# Patient Record
Sex: MALE | Race: BLACK OR AFRICAN AMERICAN | Employment: FULL TIME | URBAN - METROPOLITAN AREA
[De-identification: names, ages, dates, MRNs, and addresses within clinical notes are randomized per-mention and may not be internally consistent; named-entity substitution may affect disease eponyms.]

---

## 2017-12-11 PROBLEM — D17.9 MULTIPLE LIPOMAS: Status: ACTIVE | Noted: 2017-12-11

## 2017-12-11 PROBLEM — Z72.0 TOBACCO USE: Status: ACTIVE | Noted: 2017-12-11

## 2019-06-27 PROCEDURE — 87507 IADNA-DNA/RNA PROBE TQ 12-25: CPT | Performed by: INTERNAL MEDICINE

## 2019-08-03 ENCOUNTER — HOSPITAL ENCOUNTER (EMERGENCY)
Facility: HOSPITAL | Age: 54
Discharge: HOME | End: 2019-08-03
Admitting: NURSE PRACTITIONER
Payer: MEDICAID

## 2019-08-03 ENCOUNTER — APPOINTMENT (OUTPATIENT)
Dept: CT IMAGING | Facility: HOSPITAL | Age: 54
End: 2019-08-03
Attending: NURSE PRACTITIONER
Payer: MEDICAID

## 2019-08-03 VITALS
HEIGHT: 71 IN | OXYGEN SATURATION: 98 % | HEART RATE: 52 BPM | DIASTOLIC BLOOD PRESSURE: 75 MMHG | RESPIRATION RATE: 18 BRPM | SYSTOLIC BLOOD PRESSURE: 132 MMHG | BODY MASS INDEX: 28.7 KG/M2 | WEIGHT: 205 LBS | TEMPERATURE: 98 F

## 2019-08-03 DIAGNOSIS — R93.5 ABNORMAL CT OF THE ABDOMEN: ICD-10-CM

## 2019-08-03 DIAGNOSIS — R31.9 HEMATURIA, UNSPECIFIED TYPE: Primary | ICD-10-CM

## 2019-08-03 DIAGNOSIS — N40.0 PROSTATE ENLARGEMENT: ICD-10-CM

## 2019-08-03 LAB
ALBUMIN SERPL-MCNC: 3.7 G/DL (ref 3.4–5)
ALBUMIN/GLOB SERPL: 0.9 {RATIO} (ref 1–2)
ALP LIVER SERPL-CCNC: 86 U/L (ref 45–117)
ALT SERPL-CCNC: 78 U/L (ref 16–61)
ANION GAP SERPL CALC-SCNC: 8 MMOL/L (ref 0–18)
AST SERPL-CCNC: 57 U/L (ref 15–37)
BACTERIA UR QL AUTO: NEGATIVE /HPF
BASOPHILS # BLD AUTO: 0.02 X10(3) UL (ref 0–0.2)
BASOPHILS NFR BLD AUTO: 0.5 %
BILIRUB SERPL-MCNC: 0.4 MG/DL (ref 0.1–2)
BILIRUB UR QL: NEGATIVE
BUN BLD-MCNC: 7 MG/DL (ref 7–18)
BUN/CREAT SERPL: 7.1 (ref 10–20)
CALCIUM BLD-MCNC: 8.6 MG/DL (ref 8.5–10.1)
CHLORIDE SERPL-SCNC: 108 MMOL/L (ref 98–112)
CLARITY UR: CLEAR
CO2 SERPL-SCNC: 24 MMOL/L (ref 21–32)
COLOR UR: YELLOW
CREAT BLD-MCNC: 0.98 MG/DL (ref 0.7–1.3)
DEPRECATED RDW RBC AUTO: 39.6 FL (ref 35.1–46.3)
EOSINOPHIL # BLD AUTO: 0.02 X10(3) UL (ref 0–0.7)
EOSINOPHIL NFR BLD AUTO: 0.5 %
ERYTHROCYTE [DISTWIDTH] IN BLOOD BY AUTOMATED COUNT: 14.1 % (ref 11–15)
GLOBULIN PLAS-MCNC: 3.9 G/DL (ref 2.8–4.4)
GLUCOSE BLD-MCNC: 78 MG/DL (ref 70–99)
GLUCOSE UR-MCNC: NEGATIVE MG/DL
HCT VFR BLD AUTO: 42.7 % (ref 39–53)
HGB BLD-MCNC: 15.1 G/DL (ref 13–17.5)
IMM GRANULOCYTES # BLD AUTO: 0 X10(3) UL (ref 0–1)
IMM GRANULOCYTES NFR BLD: 0 %
KETONES UR-MCNC: NEGATIVE MG/DL
LEUKOCYTE ESTERASE UR QL STRIP.AUTO: NEGATIVE
LYMPHOCYTES # BLD AUTO: 1.96 X10(3) UL (ref 1–4)
LYMPHOCYTES NFR BLD AUTO: 47.2 %
M PROTEIN MFR SERPL ELPH: 7.6 G/DL (ref 6.4–8.2)
MCH RBC QN AUTO: 27.7 PG (ref 26–34)
MCHC RBC AUTO-ENTMCNC: 35.4 G/DL (ref 31–37)
MCV RBC AUTO: 78.2 FL (ref 80–100)
MONOCYTES # BLD AUTO: 0.44 X10(3) UL (ref 0.1–1)
MONOCYTES NFR BLD AUTO: 10.6 %
NEUTROPHILS # BLD AUTO: 1.71 X10 (3) UL (ref 1.5–7.7)
NEUTROPHILS # BLD AUTO: 1.71 X10(3) UL (ref 1.5–7.7)
NEUTROPHILS NFR BLD AUTO: 41.2 %
NITRITE UR QL STRIP.AUTO: NEGATIVE
OSMOLALITY SERPL CALC.SUM OF ELEC: 287 MOSM/KG (ref 275–295)
PH UR: 5 [PH] (ref 5–8)
PLATELET # BLD AUTO: 190 10(3)UL (ref 150–450)
POTASSIUM SERPL-SCNC: 3.8 MMOL/L (ref 3.5–5.1)
PROT UR-MCNC: NEGATIVE MG/DL
RBC # BLD AUTO: 5.46 X10(6)UL (ref 4.3–5.7)
RBC #/AREA URNS AUTO: 7 /HPF
SODIUM SERPL-SCNC: 140 MMOL/L (ref 136–145)
SP GR UR STRIP: 1.01 (ref 1–1.03)
UROBILINOGEN UR STRIP-ACNC: <2
VIT C UR-MCNC: NEGATIVE MG/DL
WBC # BLD AUTO: 4.2 X10(3) UL (ref 4–11)
WBC #/AREA URNS AUTO: 4 /HPF

## 2019-08-03 PROCEDURE — 80053 COMPREHEN METABOLIC PANEL: CPT | Performed by: NURSE PRACTITIONER

## 2019-08-03 PROCEDURE — 99284 EMERGENCY DEPT VISIT MOD MDM: CPT

## 2019-08-03 PROCEDURE — 36415 COLL VENOUS BLD VENIPUNCTURE: CPT

## 2019-08-03 PROCEDURE — 81001 URINALYSIS AUTO W/SCOPE: CPT | Performed by: NURSE PRACTITIONER

## 2019-08-03 PROCEDURE — 85025 COMPLETE CBC W/AUTO DIFF WBC: CPT | Performed by: NURSE PRACTITIONER

## 2019-08-03 PROCEDURE — 74178 CT ABD&PLV WO CNTR FLWD CNTR: CPT | Performed by: NURSE PRACTITIONER

## 2019-08-03 RX ORDER — SULFAMETHOXAZOLE AND TRIMETHOPRIM 800; 160 MG/1; MG/1
1 TABLET ORAL 2 TIMES DAILY
Qty: 14 TABLET | Refills: 0 | Status: SHIPPED | OUTPATIENT
Start: 2019-08-03 | End: 2019-08-13

## 2019-08-03 NOTE — ED PROVIDER NOTES
Patient Seen in: [facility] AND [facility] Emergency Department    History   Patient presents with:  Bleeding (hematologic)    Stated Complaint: hematuria    52yo/m with no chronic medical problems reports to the ED With complaints of painless blood at the [facility] round, and reactive to light. Conjunctivae and EOM are normal.   Neck: Normal range of motion. Neck supple. Cardiovascular: Normal rate, regular rhythm, normal heart sounds and intact distal pulses.    Pulmonary/Chest: Effort normal and breath sounds norm RAINBOW DRAW LIGHT GREEN   RAINBOW DRAW GOLD           PROCEDURE: CT ABDOMEN + PELVIS (ALL W+WO) (CPT=74178)     COMPARISON: None.     INDICATIONS: Gross hematuria with dysuria.     TECHNIQUE: CT images of the abdomen and pelvis were obtained without and scarring.               Impression     CONCLUSION:   1. No hydroureteronephrosis or urinary calcifications.  Small approximately 3 mm probable cyst interpolar region left kidney .  No other significant renal abnormalities.   2. Prostatomegaly.  Correlate wi

## 2019-08-03 NOTE — ED INITIAL ASSESSMENT (HPI)
Reports blood in urine this morning. Dysuria and blood at meatus. Denies injury or penile discharge.

## 2020-09-05 ENCOUNTER — APPOINTMENT (OUTPATIENT)
Dept: GENERAL RADIOLOGY | Facility: HOSPITAL | Age: 55
End: 2020-09-05
Attending: PHYSICIAN ASSISTANT
Payer: MEDICAID

## 2020-09-05 ENCOUNTER — HOSPITAL ENCOUNTER (EMERGENCY)
Facility: HOSPITAL | Age: 55
Discharge: HOME | End: 2020-09-05
Attending: PHYSICIAN ASSISTANT
Payer: MEDICAID

## 2020-09-05 VITALS
WEIGHT: 220 LBS | HEART RATE: 81 BPM | DIASTOLIC BLOOD PRESSURE: 68 MMHG | RESPIRATION RATE: 16 BRPM | SYSTOLIC BLOOD PRESSURE: 122 MMHG | OXYGEN SATURATION: 96 % | TEMPERATURE: 99 F | BODY MASS INDEX: 30.8 KG/M2 | HEIGHT: 71 IN

## 2020-09-05 DIAGNOSIS — R03.0 ELEVATED BLOOD PRESSURE READING: ICD-10-CM

## 2020-09-05 DIAGNOSIS — S39.012A STRAIN OF LUMBAR REGION, INITIAL ENCOUNTER: Primary | ICD-10-CM

## 2020-09-05 PROCEDURE — 99283 EMERGENCY DEPT VISIT LOW MDM: CPT

## 2020-09-05 PROCEDURE — 72100 X-RAY EXAM L-S SPINE 2/3 VWS: CPT | Performed by: PHYSICIAN ASSISTANT

## 2020-09-05 PROCEDURE — 96372 THER/PROPH/DIAG INJ SC/IM: CPT

## 2020-09-05 RX ORDER — LIDOCAINE 50 MG/G
1 PATCH TOPICAL EVERY 24 HOURS
Qty: 6 PATCH | Refills: 0 | Status: SHIPPED | OUTPATIENT
Start: 2020-09-05 | End: 2020-09-08

## 2020-09-05 RX ORDER — IBUPROFEN 600 MG/1
TABLET ORAL
Qty: 20 TABLET | Refills: 0 | Status: SHIPPED | OUTPATIENT
Start: 2020-09-05 | End: 2020-09-11

## 2020-09-05 RX ORDER — DIAZEPAM 5 MG/1
5 TABLET ORAL ONCE
Status: COMPLETED | OUTPATIENT
Start: 2020-09-05 | End: 2020-09-05

## 2020-09-05 RX ORDER — HYDROCODONE BITARTRATE AND ACETAMINOPHEN 5; 325 MG/1; MG/1
1 TABLET ORAL EVERY 6 HOURS PRN
Qty: 12 TABLET | Refills: 0 | Status: SHIPPED | OUTPATIENT
Start: 2020-09-05 | End: 2020-09-09

## 2020-09-05 RX ORDER — CYCLOBENZAPRINE HCL 10 MG
10 TABLET ORAL 3 TIMES DAILY PRN
Qty: 14 TABLET | Refills: 0 | Status: ON HOLD | OUTPATIENT
Start: 2020-09-05 | End: 2020-09-22

## 2020-09-05 RX ORDER — KETOROLAC TROMETHAMINE 30 MG/ML
60 INJECTION, SOLUTION INTRAMUSCULAR; INTRAVENOUS ONCE
Status: COMPLETED | OUTPATIENT
Start: 2020-09-05 | End: 2020-09-05

## 2020-09-05 RX ORDER — METHYLPREDNISOLONE 4 MG/1
TABLET ORAL
Qty: 1 PACKAGE | Refills: 0 | Status: SHIPPED | OUTPATIENT
Start: 2020-09-05 | End: 2020-09-08

## 2020-09-05 NOTE — ED PROVIDER NOTES
Patient Seen in: [facility] AND [facility] Emergency Department    History   Patient presents with:  Back Pain    Stated Complaint: back pain- no fall    HPI    Mp Single is a 48year old male who presents with chief complaint of bilateral low back pain.   Onset and vital signs reviewed. All other systems reviewed and negative except as noted above. PSFH elements reviewed from today and agreed except as otherwise stated in HPI.     Physical Exam     ED Triage Vitals [09/05/20 1320]   BP (!) 166/97   Pulse 7 Normal gait. Normal sensory exam.  DTRs intact and symmetric bilaterally. Patient exhibits normal speech. Skin: Skin is normal to inspection. Warm and dry. No obvious bruising. No obvious rash.       ED Course   Labs Reviewed - No data to display    M List    START taking these medications    ibuprofen 600 MG Oral Tab  Take 1 tablet (600 mg total) by mouth every 6 hours with food  Qty: 20 tablet Refills: 0    cyclobenzaprine 10 MG Oral Tab  Take 1 tablet (10 mg total) by mouth 3 (three) times daily as n

## 2020-09-05 NOTE — ED INITIAL ASSESSMENT (HPI)
PATIENT AOX3 TO ED VIA PRIVATE VEHICLE PATIENT CO OF LOWER BACK PAIN X FEW DAYS. DENIES TRAUMA. PAIN 10/10.

## 2020-09-11 ENCOUNTER — HOSPITAL ENCOUNTER (INPATIENT)
Facility: HOSPITAL | Age: 55
LOS: 6 days | Discharge: HOME HEALTH | DRG: 519 | End: 2020-09-22
Attending: EMERGENCY MEDICINE | Admitting: HOSPITALIST
Payer: MEDICAID

## 2020-09-11 ENCOUNTER — APPOINTMENT (OUTPATIENT)
Dept: MRI IMAGING | Facility: HOSPITAL | Age: 55
DRG: 519 | End: 2020-09-11
Attending: EMERGENCY MEDICINE
Payer: MEDICAID

## 2020-09-11 DIAGNOSIS — M54.16 LUMBAR RADICULOPATHY: ICD-10-CM

## 2020-09-11 DIAGNOSIS — M54.59 INTRACTABLE LOW BACK PAIN: Primary | ICD-10-CM

## 2020-09-11 DIAGNOSIS — M51.26 HNP (HERNIATED NUCLEUS PULPOSUS), LUMBAR: ICD-10-CM

## 2020-09-11 LAB
ANION GAP SERPL CALC-SCNC: 4 MMOL/L (ref 0–18)
BASOPHILS # BLD AUTO: 0.03 X10(3) UL (ref 0–0.2)
BASOPHILS NFR BLD AUTO: 0.3 %
BUN BLD-MCNC: 15 MG/DL (ref 7–18)
BUN/CREAT SERPL: 11.7 (ref 10–20)
CALCIUM BLD-MCNC: 9.7 MG/DL (ref 8.5–10.1)
CHLORIDE SERPL-SCNC: 102 MMOL/L (ref 98–112)
CO2 SERPL-SCNC: 29 MMOL/L (ref 21–32)
CREAT BLD-MCNC: 1.28 MG/DL (ref 0.7–1.3)
DEPRECATED RDW RBC AUTO: 37.2 FL (ref 35.1–46.3)
EOSINOPHIL # BLD AUTO: 0.01 X10(3) UL (ref 0–0.7)
EOSINOPHIL NFR BLD AUTO: 0.1 %
ERYTHROCYTE [DISTWIDTH] IN BLOOD BY AUTOMATED COUNT: 13.4 % (ref 11–15)
GLUCOSE BLD-MCNC: 93 MG/DL (ref 70–99)
HCT VFR BLD AUTO: 45.5 % (ref 39–53)
HGB BLD-MCNC: 16.5 G/DL (ref 13–17.5)
IMM GRANULOCYTES # BLD AUTO: 0.04 X10(3) UL (ref 0–1)
IMM GRANULOCYTES NFR BLD: 0.4 %
LYMPHOCYTES # BLD AUTO: 2.54 X10(3) UL (ref 1–4)
LYMPHOCYTES NFR BLD AUTO: 24.2 %
MCH RBC QN AUTO: 28 PG (ref 26–34)
MCHC RBC AUTO-ENTMCNC: 36.3 G/DL (ref 31–37)
MCV RBC AUTO: 77.2 FL (ref 80–100)
MONOCYTES # BLD AUTO: 1.11 X10(3) UL (ref 0.1–1)
MONOCYTES NFR BLD AUTO: 10.6 %
NEUTROPHILS # BLD AUTO: 6.75 X10 (3) UL (ref 1.5–7.7)
NEUTROPHILS # BLD AUTO: 6.75 X10(3) UL (ref 1.5–7.7)
NEUTROPHILS NFR BLD AUTO: 64.4 %
OSMOLALITY SERPL CALC.SUM OF ELEC: 281 MOSM/KG (ref 275–295)
PLATELET # BLD AUTO: 279 10(3)UL (ref 150–450)
POTASSIUM SERPL-SCNC: 3.7 MMOL/L (ref 3.5–5.1)
RBC # BLD AUTO: 5.89 X10(6)UL (ref 4.3–5.7)
SODIUM SERPL-SCNC: 135 MMOL/L (ref 136–145)
WBC # BLD AUTO: 10.5 X10(3) UL (ref 4–11)

## 2020-09-11 RX ORDER — ACETAMINOPHEN 325 MG/1
650 TABLET ORAL EVERY 4 HOURS PRN
Status: DISCONTINUED | OUTPATIENT
Start: 2020-09-11 | End: 2020-09-12 | Stop reason: ALTCHOICE

## 2020-09-11 RX ORDER — MORPHINE SULFATE 4 MG/ML
4 INJECTION, SOLUTION INTRAMUSCULAR; INTRAVENOUS EVERY 2 HOUR PRN
Status: DISCONTINUED | OUTPATIENT
Start: 2020-09-11 | End: 2020-09-15

## 2020-09-11 RX ORDER — MORPHINE SULFATE 2 MG/ML
1 INJECTION, SOLUTION INTRAMUSCULAR; INTRAVENOUS EVERY 2 HOUR PRN
Status: DISCONTINUED | OUTPATIENT
Start: 2020-09-11 | End: 2020-09-15

## 2020-09-11 RX ORDER — SODIUM CHLORIDE 0.9 % (FLUSH) 0.9 %
3 SYRINGE (ML) INJECTION AS NEEDED
Status: DISCONTINUED | OUTPATIENT
Start: 2020-09-11 | End: 2020-09-21 | Stop reason: HOSPADM

## 2020-09-11 RX ORDER — MORPHINE SULFATE 4 MG/ML
INJECTION, SOLUTION INTRAMUSCULAR; INTRAVENOUS
Status: COMPLETED
Start: 2020-09-11 | End: 2020-09-11

## 2020-09-11 RX ORDER — HYDROCODONE BITARTRATE AND ACETAMINOPHEN 5; 325 MG/1; MG/1
1 TABLET ORAL EVERY 4 HOURS PRN
Status: DISCONTINUED | OUTPATIENT
Start: 2020-09-11 | End: 2020-09-21 | Stop reason: HOSPADM

## 2020-09-11 RX ORDER — LORAZEPAM 2 MG/ML
INJECTION INTRAMUSCULAR
Status: COMPLETED
Start: 2020-09-11 | End: 2020-09-11

## 2020-09-11 RX ORDER — HYDROCODONE BITARTRATE AND ACETAMINOPHEN 10; 325 MG/1; MG/1
1 TABLET ORAL EVERY 4 HOURS PRN
Status: DISCONTINUED | OUTPATIENT
Start: 2020-09-11 | End: 2020-09-21 | Stop reason: HOSPADM

## 2020-09-11 RX ORDER — DIAZEPAM 5 MG/1
5 TABLET ORAL EVERY 6 HOURS PRN
Status: DISCONTINUED | OUTPATIENT
Start: 2020-09-11 | End: 2020-09-15

## 2020-09-11 RX ORDER — MORPHINE SULFATE 4 MG/ML
4 INJECTION, SOLUTION INTRAMUSCULAR; INTRAVENOUS ONCE
Status: COMPLETED | OUTPATIENT
Start: 2020-09-11 | End: 2020-09-11

## 2020-09-11 RX ORDER — LORAZEPAM 2 MG/ML
1 INJECTION INTRAMUSCULAR ONCE
Status: COMPLETED | OUTPATIENT
Start: 2020-09-11 | End: 2020-09-11

## 2020-09-11 RX ORDER — HEPARIN SODIUM 5000 [USP'U]/ML
5000 INJECTION, SOLUTION INTRAVENOUS; SUBCUTANEOUS EVERY 8 HOURS SCHEDULED
Status: DISCONTINUED | OUTPATIENT
Start: 2020-09-12 | End: 2020-09-13

## 2020-09-11 RX ORDER — DIAZEPAM 5 MG/1
5 TABLET ORAL ONCE
Status: COMPLETED | OUTPATIENT
Start: 2020-09-11 | End: 2020-09-11

## 2020-09-11 RX ORDER — METHYLPREDNISOLONE SODIUM SUCCINATE 125 MG/2ML
60 INJECTION, POWDER, LYOPHILIZED, FOR SOLUTION INTRAMUSCULAR; INTRAVENOUS ONCE
Status: COMPLETED | OUTPATIENT
Start: 2020-09-11 | End: 2020-09-11

## 2020-09-11 RX ORDER — CYCLOBENZAPRINE HCL 10 MG
10 TABLET ORAL 3 TIMES DAILY PRN
Status: DISCONTINUED | OUTPATIENT
Start: 2020-09-11 | End: 2020-09-21 | Stop reason: HOSPADM

## 2020-09-11 RX ORDER — MORPHINE SULFATE 2 MG/ML
2 INJECTION, SOLUTION INTRAMUSCULAR; INTRAVENOUS EVERY 2 HOUR PRN
Status: DISCONTINUED | OUTPATIENT
Start: 2020-09-11 | End: 2020-09-15

## 2020-09-11 RX ORDER — HYDROCODONE BITARTRATE AND ACETAMINOPHEN 5; 325 MG/1; MG/1
2 TABLET ORAL EVERY 4 HOURS PRN
Status: DISCONTINUED | OUTPATIENT
Start: 2020-09-11 | End: 2020-09-21 | Stop reason: HOSPADM

## 2020-09-11 NOTE — H&P
[facility] Hospitalist Team  History and Physical  Admit Date:  9/11/20    ASSESSMENT / PLAN:   47 yo male with hx cigar use who presents with low back pain with radicular symptoms, MRI and orthospine consulted, see below for details    Intractable LBP with Radic 135/93   Pulse 84   Temp 98 °F (36.7 °C) (Oral)   Resp 18   Ht 5' 11\" (1.803 m)   Wt 222 lb (100.7 kg)   SpO2 97%   BMI 30.96 kg/m²     GENERAL: no apparent distress, overweight  NEUROLOGIC: A/A; Ox3, weakness to left leg with inability to dorsiflex  SKIN and examined independently. Discussed with APN and agree with note above. HPI51 yo male with hx cigar use who presents with low back pain with radicular symptoms  Symptoms started 2 weeks ago when he got out of bed.   Then this weekend was helping his

## 2020-09-11 NOTE — ED NOTES
Orders for admission, patient is aware of plan and ready to go upstairs. Any questions, please call ED RN LENY YEBOAH at [address].      Type of COVID test sent: m2000  COVID Suspicion level: Low    LOC at time of transport: Alert and oriented x 3.     Othe

## 2020-09-11 NOTE — ED INITIAL ASSESSMENT (HPI)
Pt reports lower back pain after bending to  a box. reports bilateral lower leg numbness and difficulty ambulating.  Reports falling this AM.

## 2020-09-11 NOTE — ED NOTES
Patient was feeling numbness and tingling in his L leg and had collapsed. Patient has hx of back pain but never to this extent. Patient states his legs had given out on him and he is still having difficulty walking without assistance.  Denies dizziness, susana

## 2020-09-11 NOTE — ED NOTES
Received call from MRI tech-patient unable to tolerate laying position for MRI. Orders received for pain medication. Patient medicated in MRI department.

## 2020-09-11 NOTE — ED PROVIDER NOTES
Patient Seen in: [facility] AND [facility] Emergency Department      History   Patient presents with:  Back Pain    Stated Complaint: Bilateral leg numbness- was seen here on Sat for back pain.      HPI    71-year-old healthy with now about 2 weeks of on and off 180.3 cm (5' 11\")   Wt 100.7 kg   SpO2 97%   BMI 30.96 kg/m²         Physical Exam    Constitutional: Oriented to person, place, and time. Appears well-developed. No distress. Head: Normocephalic and atraumatic.    Eyes: Conjunctivae are normal. Pupils individual orders.    RAINBOW DRAW BLUE   RAINBOW DRAW LAVENDER   RAINBOW DRAW LIGHT GREEN   RAINBOW DRAW GOLD          Xr Lumbar Spine (min 2 Views) (cpt=72100)    Result Date: 9/5/2020  PROCEDURE: XR LUMBAR SPINE (MIN 2 VIEWS) (CPT=72100)  COMPARISON: Non Admission  Date Reviewed: 9/8/2020          ICD-10-CM Noted POA    Intractable low back pain M54.5 9/11/2020 Unknown

## 2020-09-12 ENCOUNTER — APPOINTMENT (OUTPATIENT)
Dept: MRI IMAGING | Facility: HOSPITAL | Age: 55
DRG: 519 | End: 2020-09-12
Attending: ORTHOPAEDIC SURGERY
Payer: MEDICAID

## 2020-09-12 ENCOUNTER — APPOINTMENT (OUTPATIENT)
Dept: MRI IMAGING | Facility: HOSPITAL | Age: 55
DRG: 519 | End: 2020-09-12
Attending: EMERGENCY MEDICINE
Payer: MEDICAID

## 2020-09-12 LAB
ANION GAP SERPL CALC-SCNC: 6 MMOL/L (ref 0–18)
BASOPHILS # BLD AUTO: 0.01 X10(3) UL (ref 0–0.2)
BASOPHILS NFR BLD AUTO: 0.1 %
BUN BLD-MCNC: 16 MG/DL (ref 7–18)
BUN/CREAT SERPL: 13.4 (ref 10–20)
CALCIUM BLD-MCNC: 9.4 MG/DL (ref 8.5–10.1)
CHLORIDE SERPL-SCNC: 103 MMOL/L (ref 98–112)
CO2 SERPL-SCNC: 28 MMOL/L (ref 21–32)
CREAT BLD-MCNC: 1.19 MG/DL (ref 0.7–1.3)
CRP SERPL-MCNC: <0.29 MG/DL (ref ?–0.3)
DEPRECATED RDW RBC AUTO: 37.8 FL (ref 35.1–46.3)
EOSINOPHIL # BLD AUTO: 0 X10(3) UL (ref 0–0.7)
EOSINOPHIL NFR BLD AUTO: 0 %
ERYTHROCYTE [DISTWIDTH] IN BLOOD BY AUTOMATED COUNT: 13.6 % (ref 11–15)
ERYTHROCYTE [SEDIMENTATION RATE] IN BLOOD: 18 MM/HR (ref 0–20)
GLUCOSE BLD-MCNC: 90 MG/DL (ref 70–99)
HCT VFR BLD AUTO: 44.7 % (ref 39–53)
HGB BLD-MCNC: 16.2 G/DL (ref 13–17.5)
IMM GRANULOCYTES # BLD AUTO: 0.03 X10(3) UL (ref 0–1)
IMM GRANULOCYTES NFR BLD: 0.2 %
LYMPHOCYTES # BLD AUTO: 2.44 X10(3) UL (ref 1–4)
LYMPHOCYTES NFR BLD AUTO: 19.9 %
MCH RBC QN AUTO: 28 PG (ref 26–34)
MCHC RBC AUTO-ENTMCNC: 36.2 G/DL (ref 31–37)
MCV RBC AUTO: 77.2 FL (ref 80–100)
MONOCYTES # BLD AUTO: 1.17 X10(3) UL (ref 0.1–1)
MONOCYTES NFR BLD AUTO: 9.5 %
NEUTROPHILS # BLD AUTO: 8.61 X10 (3) UL (ref 1.5–7.7)
NEUTROPHILS # BLD AUTO: 8.61 X10(3) UL (ref 1.5–7.7)
NEUTROPHILS NFR BLD AUTO: 70.3 %
OSMOLALITY SERPL CALC.SUM OF ELEC: 285 MOSM/KG (ref 275–295)
PLATELET # BLD AUTO: 257 10(3)UL (ref 150–450)
POTASSIUM SERPL-SCNC: 3.6 MMOL/L (ref 3.5–5.1)
POTASSIUM SERPL-SCNC: 3.7 MMOL/L (ref 3.5–5.1)
RBC # BLD AUTO: 5.79 X10(6)UL (ref 4.3–5.7)
SARS-COV-2 RNA RESP QL NAA+PROBE: NOT DETECTED
SODIUM SERPL-SCNC: 137 MMOL/L (ref 136–145)
WBC # BLD AUTO: 12.3 X10(3) UL (ref 4–11)

## 2020-09-12 PROCEDURE — 72148 MRI LUMBAR SPINE W/O DYE: CPT | Performed by: ORTHOPAEDIC SURGERY

## 2020-09-12 RX ORDER — ACETAMINOPHEN 500 MG
1000 TABLET ORAL EVERY 6 HOURS PRN
Status: DISCONTINUED | OUTPATIENT
Start: 2020-09-12 | End: 2020-09-21 | Stop reason: HOSPADM

## 2020-09-12 RX ORDER — LORAZEPAM 2 MG/ML
0.5 INJECTION INTRAMUSCULAR ONCE
Status: COMPLETED | OUTPATIENT
Start: 2020-09-12 | End: 2020-09-12

## 2020-09-12 RX ORDER — POTASSIUM CHLORIDE 20 MEQ/1
40 TABLET, EXTENDED RELEASE ORAL EVERY 4 HOURS
Status: COMPLETED | OUTPATIENT
Start: 2020-09-12 | End: 2020-09-12

## 2020-09-12 NOTE — OCCUPATIONAL THERAPY NOTE
Order received and chart reviewed. Patient awaiting MRI and spine surgery consult. Will wait for consult and MRI prior to initiating occupational therapy services.     Ventura Tam, OTR/L  [address] [facility]  [id number]

## 2020-09-12 NOTE — PLAN OF CARE
Problem: Patient Centered Care  Goal: Patient preferences are identified and integrated in the patient's plan of care  Description  Interventions:  - What would you like us to know as we care for you?  Back pain started about 2 weeks ago  - Provide timely light within reach.

## 2020-09-12 NOTE — PLAN OF CARE
Problem: Patient Centered Care  Goal: Patient preferences are identified and integrated in the patient's plan of care  Description  Interventions:  - What would you like us to know as we care for you?  Back pain started about 2 weeks ago  - Provide timely frequently for physical needs  - Identify cognitive and physical deficits and behaviors that affect risk of falls.   - [place] fall precautions as indicated by assessment.  - Educate pt/family on patient safety including physical limitations  - Instruct p

## 2020-09-12 NOTE — PROGRESS NOTES
[facility] Hospitalist Progress Note     CC: Hospital Follow up    PCP: Shellie Monday, MD       Assessment/Plan:     Principal Problem:    Intractable low back pain  Active Problems:    Lumbar radiculopathy    47 yo male with hx cigar use who presents with low dry  EXT: no edema      Data Review:       Labs:     Recent Labs   Lab 09/11/20  1138 09/12/20  0728   RBC 5.89* 5.79*   HGB 16.5 16.2   HCT 45.5 44.7   MCV 77.2* 77.2*   MCH 28.0 28.0   MCHC 36.3 36.2   RDW 13.4 13.6   NEPRELIM 6.75 8.61*   WBC 10.5 12.3*

## 2020-09-13 LAB — POTASSIUM SERPL-SCNC: 4.2 MMOL/L (ref 3.5–5.1)

## 2020-09-13 RX ORDER — POTASSIUM CHLORIDE 20 MEQ/1
40 TABLET, EXTENDED RELEASE ORAL ONCE
Status: COMPLETED | OUTPATIENT
Start: 2020-09-13 | End: 2020-09-13

## 2020-09-13 NOTE — PLAN OF CARE
Problem: Patient Centered Care  Goal: Patient preferences are identified and integrated in the patient's plan of care  Description  Interventions:  - What would you like us to know as we care for you?  Back pain started about 2 weeks ago  - Provide timely frequently for physical needs  - Identify cognitive and physical deficits and behaviors that affect risk of falls.   - [place] fall precautions as indicated by assessment.  - Educate pt/family on patient safety including physical limitations  - Instruct p

## 2020-09-13 NOTE — CHRONIC PAIN
[facility]  Report of Consultation    Sesar Vásquez Patient Status:  Observation    1965 MRN [id number]   Location [facility] 5SW/SE Attending Goldy Marsh MD   Hosp Day # 0 PCP Cb Dang MD     Date of Admission:   STRENGTH) tab 1,000 mg, 1,000 mg, Oral, Q6H PRN  •  Normal Saline Flush 0.9 % injection 3 mL, 3 mL, Intravenous, PRN  •  morphINE sulfate (PF) 2 MG/ML injection 1 mg, 1 mg, Intravenous, Q2H PRN **OR** morphINE sulfate (PF) 2 MG/ML injection 2 mg, 2 mg, Int appreciate spine surgery input   if he does not improve with the lumbar steroid injection further MRI studies may be warranted                      Thank you for allowing me to participate in the care of your patient.         Chi Ruiz  9/13/2020  4:02

## 2020-09-13 NOTE — OCCUPATIONAL THERAPY NOTE
OCCUPATIONAL THERAPY EVALUATION - INPATIENT     Room Number: 237/659-F  Evaluation Date: 9/13/2020  Type of Evaluation: Initial  Presenting Problem: c/o BLE numbness    Physician Order: IP Consult to Occupational Therapy  Reason for Therapy: ADL/IADL Dysfu training;Patient/Family education; Compensatory technique education       OCCUPATIONAL THERAPY MEDICAL/SOCIAL HISTORY     Problem List  Principal Problem:    Intractable low back pain  Active Problems:    Lumbar radiculopathy      Past Medical History  Past roll)  Sit to Stand: Minimum assistance(with RW and chair follow for safety 2/2 B T[place])  Toilet Transfer: MIN A with RW and chair follow; simulated with ambulation bathroom distance and t/f to chair  Chair Transfer: MIN A with RW to bedside chair

## 2020-09-13 NOTE — PLAN OF CARE
Problem: Patient Centered Care  Goal: Patient preferences are identified and integrated in the patient's plan of care  Description  Interventions:  - What would you like us to know as we care for you?  Back pain started about 2 weeks ago  - Provide timely frequently for physical needs  - Identify cognitive and physical deficits and behaviors that affect risk of falls.   - [place] fall precautions as indicated by assessment.  - Educate pt/family on patient safety including physical limitations  - Instruct p

## 2020-09-13 NOTE — PROGRESS NOTES
[facility] Hospitalist Progress Note     CC: Hospital Follow up    PCP: Jessica Goff MD       Assessment/Plan:     Principal Problem:    Intractable low back pain  Active Problems:    Lumbar radiculopathy    49 yo male with hx cigar use who presents with low Weights  09/11/20 1108 : 222 lb (100.7 kg)  09/12/20 1725 : 222 lb (100.7 kg)  09/09/20 1015 : 222 lb (100.7 kg)      Exam   GEN: NAD  HEENT: EOMI, PERRLA  Neck: Supple, no JVD  Pulm: CTAB, no crackles or wheezes  CV: RRR, no murmurs, 2+ peripheral pulses acetaminophen, Normal Saline Flush, morphINE sulfate **OR** morphINE sulfate **OR** morphINE sulfate, [DISCONTINUED] acetaminophen **OR** HYDROcodone-acetaminophen **OR** HYDROcodone-acetaminophen, cyclobenzaprine, diazepam, HYDROcodone-acetaminophen

## 2020-09-13 NOTE — PHYSICAL THERAPY NOTE
PHYSICAL THERAPY EVALUATION - INPATIENT     Room Number: 340/022-I  Evaluation Date: 9/13/2020  Type of Evaluation: Initial   Physician Order: PT Eval and Treat    Presenting Problem: admitted due to c/o intractable LBP, bilateral leg numbness and falls; Acute rehab to regain back his strength, stability and independence before going back to his home setting. Patient will benefit from continued IP PT services to address these deficits in preparation for discharge.     DISCHARGE RECOMMENDATIONS  PT Discha FINDINGS  Pt c/o numbness to LLE and pins and needles to RLE       AM-PAC '6-Clicks' INPATIENT SHORT FORM - BASIC MOBILITY  How much difficulty does the patient currently have. ..  -   Turning over in bed (including adjusting bedclothes, sheets and blankets Patient is able to ambulate 300 feet with assist device: walker - rolling at assistance level: supervision   Goal #3   Current Status    Goal #4 Patient will negotiate 15 stairs, 1 rail, supervision   Goal #4   Current Status    Goal #5 Patient to demonstr

## 2020-09-13 NOTE — CONSULTS
Patient: Alena Landa  Medical Record Number: [id number]  Referring Physician:  No ref.  provider found  PCP: Vidal Bolden MD    HISTORY OF CHIEF COMPLAINT:    CC:Back Pain, Leg Symtpoms    HPI: Alena Landa is a 48year old male smoker who present ALIGNMENT:    Minimal degenerative retrolisthesis of L5 S1.  VERTEBRAL BODIES:               Will mild chronic anterior wedging of T12, and minimal chronic anterior wedging of T1 vertebral bodies. Vertebral bodies are otherwise intact.   DISC SPACES: Endpl L3-L4:   Decrease in disc height with a spondylotic disc bulge. Facet arthrosis and ligamentum flavum hypertrophy. Moderate central narrowing and mild bilateral foraminal narrowing.   L4-L5:   Decrease in disc height with a spondylotic disc bulge and supe I reviewed the elements of the patient's presentation, pertinent exam findings, imaging, and the patient's diagnosis using the appropriate spine model. His imaging is suboptimal although there is evidence of stenosis.  This however doesn't quite fit his cli Genitourinary: Negative for dysuria, frequency and urgency. Musculoskeletal: Positive for back pain and myalgias. Skin: Negative for itching and rash. Neurological: Positive for tingling, sensory change and focal weakness.  Negative for dizziness, sei

## 2020-09-14 ENCOUNTER — ANESTHESIA EVENT (OUTPATIENT)
Dept: SURGERY | Facility: HOSPITAL | Age: 55
DRG: 519 | End: 2020-09-14
Payer: MEDICAID

## 2020-09-14 ENCOUNTER — APPOINTMENT (OUTPATIENT)
Dept: GENERAL RADIOLOGY | Facility: HOSPITAL | Age: 55
DRG: 519 | End: 2020-09-14
Attending: ANESTHESIOLOGY
Payer: MEDICAID

## 2020-09-14 ENCOUNTER — ANESTHESIA (OUTPATIENT)
Dept: SURGERY | Facility: HOSPITAL | Age: 55
DRG: 519 | End: 2020-09-14
Payer: MEDICAID

## 2020-09-14 LAB
INR BLD: 0.86 (ref 0.9–1.2)
PROTHROMBIN TIME: 11.6 SECONDS (ref 11.8–14.5)

## 2020-09-14 PROCEDURE — 3E0R37Z INTRODUCTION OF ELECTROLYTIC AND WATER BALANCE SUBSTANCE INTO SPINAL CANAL, PERCUTANEOUS APPROACH: ICD-10-PCS | Performed by: ANESTHESIOLOGY

## 2020-09-14 PROCEDURE — 3E0R33Z INTRODUCTION OF ANTI-INFLAMMATORY INTO SPINAL CANAL, PERCUTANEOUS APPROACH: ICD-10-PCS | Performed by: ANESTHESIOLOGY

## 2020-09-14 PROCEDURE — B01B1ZZ FLUOROSCOPY OF SPINAL CORD USING LOW OSMOLAR CONTRAST: ICD-10-PCS | Performed by: ANESTHESIOLOGY

## 2020-09-14 RX ORDER — MORPHINE SULFATE 10 MG/ML
6 INJECTION, SOLUTION INTRAMUSCULAR; INTRAVENOUS EVERY 10 MIN PRN
Status: DISCONTINUED | OUTPATIENT
Start: 2020-09-14 | End: 2020-09-14 | Stop reason: HOSPADM

## 2020-09-14 RX ORDER — HYDROCODONE BITARTRATE AND ACETAMINOPHEN 5; 325 MG/1; MG/1
2 TABLET ORAL AS NEEDED
Status: DISCONTINUED | OUTPATIENT
Start: 2020-09-14 | End: 2020-09-14 | Stop reason: HOSPADM

## 2020-09-14 RX ORDER — ONDANSETRON 2 MG/ML
4 INJECTION INTRAMUSCULAR; INTRAVENOUS ONCE AS NEEDED
Status: DISCONTINUED | OUTPATIENT
Start: 2020-09-14 | End: 2020-09-14 | Stop reason: HOSPADM

## 2020-09-14 RX ORDER — LIDOCAINE HYDROCHLORIDE 10 MG/ML
INJECTION, SOLUTION EPIDURAL; INFILTRATION; INTRACAUDAL; PERINEURAL AS NEEDED
Status: DISCONTINUED | OUTPATIENT
Start: 2020-09-14 | End: 2020-09-14 | Stop reason: HOSPADM

## 2020-09-14 RX ORDER — METHYLPREDNISOLONE ACETATE 80 MG/ML
INJECTION, SUSPENSION INTRA-ARTICULAR; INTRALESIONAL; INTRAMUSCULAR; SOFT TISSUE AS NEEDED
Status: DISCONTINUED | OUTPATIENT
Start: 2020-09-14 | End: 2020-09-14 | Stop reason: HOSPADM

## 2020-09-14 RX ORDER — HYDROCODONE BITARTRATE AND ACETAMINOPHEN 5; 325 MG/1; MG/1
1 TABLET ORAL AS NEEDED
Status: DISCONTINUED | OUTPATIENT
Start: 2020-09-14 | End: 2020-09-14 | Stop reason: HOSPADM

## 2020-09-14 RX ORDER — HYDROMORPHONE HYDROCHLORIDE 1 MG/ML
0.6 INJECTION, SOLUTION INTRAMUSCULAR; INTRAVENOUS; SUBCUTANEOUS EVERY 5 MIN PRN
Status: DISCONTINUED | OUTPATIENT
Start: 2020-09-14 | End: 2020-09-14 | Stop reason: HOSPADM

## 2020-09-14 RX ORDER — NALOXONE HYDROCHLORIDE 0.4 MG/ML
80 INJECTION, SOLUTION INTRAMUSCULAR; INTRAVENOUS; SUBCUTANEOUS AS NEEDED
Status: DISCONTINUED | OUTPATIENT
Start: 2020-09-14 | End: 2020-09-14 | Stop reason: HOSPADM

## 2020-09-14 RX ORDER — HALOPERIDOL 5 MG/ML
0.25 INJECTION INTRAMUSCULAR ONCE AS NEEDED
Status: DISCONTINUED | OUTPATIENT
Start: 2020-09-14 | End: 2020-09-14 | Stop reason: HOSPADM

## 2020-09-14 RX ORDER — MORPHINE SULFATE 4 MG/ML
4 INJECTION, SOLUTION INTRAMUSCULAR; INTRAVENOUS EVERY 10 MIN PRN
Status: DISCONTINUED | OUTPATIENT
Start: 2020-09-14 | End: 2020-09-14 | Stop reason: HOSPADM

## 2020-09-14 RX ORDER — PROCHLORPERAZINE EDISYLATE 5 MG/ML
5 INJECTION INTRAMUSCULAR; INTRAVENOUS ONCE AS NEEDED
Status: DISCONTINUED | OUTPATIENT
Start: 2020-09-14 | End: 2020-09-14 | Stop reason: HOSPADM

## 2020-09-14 RX ORDER — SODIUM CHLORIDE, SODIUM LACTATE, POTASSIUM CHLORIDE, CALCIUM CHLORIDE 600; 310; 30; 20 MG/100ML; MG/100ML; MG/100ML; MG/100ML
INJECTION, SOLUTION INTRAVENOUS CONTINUOUS
Status: DISCONTINUED | OUTPATIENT
Start: 2020-09-14 | End: 2020-09-15

## 2020-09-14 RX ORDER — SODIUM CHLORIDE, SODIUM LACTATE, POTASSIUM CHLORIDE, CALCIUM CHLORIDE 600; 310; 30; 20 MG/100ML; MG/100ML; MG/100ML; MG/100ML
INJECTION, SOLUTION INTRAVENOUS CONTINUOUS
Status: DISCONTINUED | OUTPATIENT
Start: 2020-09-14 | End: 2020-09-14 | Stop reason: HOSPADM

## 2020-09-14 RX ORDER — HYDROMORPHONE HYDROCHLORIDE 1 MG/ML
0.2 INJECTION, SOLUTION INTRAMUSCULAR; INTRAVENOUS; SUBCUTANEOUS EVERY 5 MIN PRN
Status: DISCONTINUED | OUTPATIENT
Start: 2020-09-14 | End: 2020-09-14 | Stop reason: HOSPADM

## 2020-09-14 RX ORDER — HYDROMORPHONE HYDROCHLORIDE 1 MG/ML
0.4 INJECTION, SOLUTION INTRAMUSCULAR; INTRAVENOUS; SUBCUTANEOUS EVERY 5 MIN PRN
Status: DISCONTINUED | OUTPATIENT
Start: 2020-09-14 | End: 2020-09-14 | Stop reason: HOSPADM

## 2020-09-14 RX ORDER — MIDAZOLAM HYDROCHLORIDE 1 MG/ML
INJECTION INTRAMUSCULAR; INTRAVENOUS AS NEEDED
Status: DISCONTINUED | OUTPATIENT
Start: 2020-09-14 | End: 2020-09-14 | Stop reason: SURG

## 2020-09-14 RX ORDER — MORPHINE SULFATE 4 MG/ML
2 INJECTION, SOLUTION INTRAMUSCULAR; INTRAVENOUS EVERY 10 MIN PRN
Status: DISCONTINUED | OUTPATIENT
Start: 2020-09-14 | End: 2020-09-14 | Stop reason: HOSPADM

## 2020-09-14 RX ADMIN — LIDOCAINE HYDROCHLORIDE 50 MG: 10 INJECTION, SOLUTION EPIDURAL; INFILTRATION; INTRACAUDAL; PERINEURAL at 13:57:00

## 2020-09-14 RX ADMIN — MIDAZOLAM HYDROCHLORIDE 2 MG: 1 INJECTION INTRAMUSCULAR; INTRAVENOUS at 13:55:00

## 2020-09-14 NOTE — PLAN OF CARE
Patient received back from procedure. Alert and oriented x 4. Patient has band aid present to lower center of back that is dry and intact. Vital signs taken and stable. No complaints of pain or discomfort at current time. Call light within reach.  Fall risk

## 2020-09-14 NOTE — PROGRESS NOTES
[facility] Hospitalist Team  Progress Note    Mally Lucie Patient Status:  Observation    1965 MRN [id number]   Location [facility] 5SW/SE Attending Fede Beck MD   Hosp Day # 0 PCP Sima Hathaway MD     CC: Follow Up  PCP: Sima Hathaway MD Still unable to ambulate due to weakness in left foot and foot drop. Tells me he needs a walker otherwise his legs keep buckling under him. OBJECTIVE:   Blood pressure 132/88, pulse 74, temperature 97.8 °F (36.6 °C), temperature source Oral, resp.  rat PRN  diazepam (VALIUM) tab 5 mg, 5 mg, Oral, Q6H PRN  HYDROcodone-acetaminophen (NORCO)  MG per tab 1 tablet, 1 tablet, Oral, Q4H PRN          IMAGING     Mri Spine Lumbar (cpt=72148)    Result Date: 9/12/2020  CONCLUSION:  1.  Evaluation degraded by

## 2020-09-14 NOTE — H&P
[facility]]  H&P Note    Ira Karthikeyan Patient Status:  Observation    1965 MRN [id number]   Location [address] UNIT Attending Sd Bowens MD   Hosp Day # 0 PCP Taylor Ross MD       Ira Napier is a 48 year o (36.7 °C) (Oral)   Resp 17   Ht 1.803 m (5' 11\")   Wt 100.7 kg (222 lb)   SpO2 97%   BMI 30.96 kg/m²  - Body mass index is 30.96 kg/m².      GENERAL: well developed, well nourished, in no apparent distress  SKIN: no rashes, no suspicious lesions  HEENT: Sy

## 2020-09-14 NOTE — ANESTHESIA POSTPROCEDURE EVALUATION
Patient: Jody Ybarra    Procedure Summary     Date:  09/14/20 Room / Location:  [facility] OR 03 / [facility] OR    Anesthesia Start:  0102 Anesthesia Stop:  1430    Procedure:  TRANSFORAMINAL LUMBAR EPIDURAL STEROID INJECTION SINGLE LEVEL (N/A Back) Diagnosi

## 2020-09-14 NOTE — PLAN OF CARE
Problem: Patient Centered Care  Goal: Patient preferences are identified and integrated in the patient's plan of care  Description  Interventions:  - What would you like us to know as we care for you?  Back pain started about 2 weeks ago  - Provide timely frequently for physical needs  - Identify cognitive and physical deficits and behaviors that affect risk of falls.   - [place] fall precautions as indicated by assessment.  - Educate pt/family on patient safety including physical limitations  - Instruct p

## 2020-09-14 NOTE — BRIEF OP NOTE
PREOPERATIVE DIAGNOSIS: Back pain and lumbar radiculopathy    POSTOPERATIVE DIAGNOSIS: Back pain and lumbar radiculopathy    PROCEDURE PERFORMED: Lumbar Epidural Steroid Injection with Fluoroscopic Guidance      SURGEON: Jose Aguilar DO    ANESTHESIA: The fluoroscope was switched back to the AP view to identify the L4-5 level, where the skin and subcutaneous tissue was infiltrated with 1% lidocaine.   An 18-gauge Tuohy epidural needle was then inserted through the skin and advanced towards the chosen

## 2020-09-14 NOTE — ANESTHESIA PREPROCEDURE EVALUATION
Anesthesia PreOp Note    HPI:     Yeni Banks is a 48year old male who presents for preoperative consultation requested by: Millie Perry MD    Date of Surgery: 9/11/2020 - 9/14/2020    Procedure(s):  TRANSFORAMINAL LUMBAR EPIDURAL STEROID INJECTION S [MAR Hold] morphINE sulfate (PF) 2 MG/ML injection 2 mg, 2 mg, Intravenous, Q2H PRN, Iliana Vang NP, 2 mg at 09/12/20 1800    Or  [MAR Hold] morphINE sulfate (PF) 4 MG/ML injection 4 mg, 4 mg, Intravenous, Q2H PRN, Erick Vang NP  [MAR Hold] HYDR Alcohol/week: 12.0 standard drinks        Types: 6 Cans of beer, 6 Shots of liquor per week      Drug use: No        Comment: history of cannabis use      Sexual activity: Not on file    Lifestyle      Physical activity:        Days per week: Not on height is 1.803 m (5' 11\") and weight is 100.7 kg (222 lb). His oral temperature is 98.1 °F (36.7 °C). His blood pressure is 135/82 and his pulse is 71.  His respiration is 18 and oxygen saturation is 99%.    09/14/20  0523 09/14/20  0538 09/14/20  1006 0

## 2020-09-14 NOTE — PLAN OF CARE
Problem: Patient Centered Care  Goal: Patient preferences are identified and integrated in the patient's plan of care  Description  Interventions:  - What would you like us to know as we care for you?  Back pain started about 2 weeks ago  - Provide timely frequently for physical needs  - Identify cognitive and physical deficits and behaviors that affect risk of falls.   - [place] fall precautions as indicated by assessment.  - Educate pt/family on patient safety including physical limitations  - Instruct p

## 2020-09-14 NOTE — H&P
History & Physical Examination    Patient Name: Kwame Larsen  MRN: [id number]  [facility]: [id number]  YOB: 1965    Diagnosis: lumbar radiculopathy    Present Illness: back pain    HYDROcodone-acetaminophen  MG Oral Tab, Take 1 tablet by mouth 30.00        Pack years: 30        Types: Cigars      Smokeless tobacco: Never Used      Tobacco comment: smoked for 25 years; quit for 9 and now resumed with smoking 2-3 cigars daily    Alcohol use:  Yes      Alcohol/week: 12.0 standard drinks      Types:

## 2020-09-15 ENCOUNTER — APPOINTMENT (OUTPATIENT)
Dept: GENERAL RADIOLOGY | Facility: HOSPITAL | Age: 55
DRG: 519 | End: 2020-09-15
Attending: Other
Payer: MEDICAID

## 2020-09-15 LAB
ANION GAP SERPL CALC-SCNC: 4 MMOL/L (ref 0–18)
BASOPHILS # BLD AUTO: 0.03 X10(3) UL (ref 0–0.2)
BASOPHILS NFR BLD AUTO: 0.4 %
BUN BLD-MCNC: 17 MG/DL (ref 7–18)
BUN/CREAT SERPL: 12.1 (ref 10–20)
CALCIUM BLD-MCNC: 10.1 MG/DL (ref 8.5–10.1)
CHLORIDE SERPL-SCNC: 100 MMOL/L (ref 98–112)
CO2 SERPL-SCNC: 32 MMOL/L (ref 21–32)
CREAT BLD-MCNC: 1.4 MG/DL (ref 0.7–1.3)
DEPRECATED RDW RBC AUTO: 38.1 FL (ref 35.1–46.3)
EOSINOPHIL # BLD AUTO: 0.06 X10(3) UL (ref 0–0.7)
EOSINOPHIL NFR BLD AUTO: 0.8 %
ERYTHROCYTE [DISTWIDTH] IN BLOOD BY AUTOMATED COUNT: 14 % (ref 11–15)
GLUCOSE BLD-MCNC: 121 MG/DL (ref 70–99)
HCT VFR BLD AUTO: 49.7 % (ref 39–53)
HGB BLD-MCNC: 17.6 G/DL (ref 13–17.5)
IMM GRANULOCYTES # BLD AUTO: 0.02 X10(3) UL (ref 0–1)
IMM GRANULOCYTES NFR BLD: 0.3 %
LYMPHOCYTES # BLD AUTO: 2.21 X10(3) UL (ref 1–4)
LYMPHOCYTES NFR BLD AUTO: 29.2 %
MCH RBC QN AUTO: 27.8 PG (ref 26–34)
MCHC RBC AUTO-ENTMCNC: 35.4 G/DL (ref 31–37)
MCV RBC AUTO: 78.6 FL (ref 80–100)
MONOCYTES # BLD AUTO: 0.75 X10(3) UL (ref 0.1–1)
MONOCYTES NFR BLD AUTO: 9.9 %
NEUTROPHILS # BLD AUTO: 4.49 X10 (3) UL (ref 1.5–7.7)
NEUTROPHILS # BLD AUTO: 4.49 X10(3) UL (ref 1.5–7.7)
NEUTROPHILS NFR BLD AUTO: 59.4 %
OSMOLALITY SERPL CALC.SUM OF ELEC: 285 MOSM/KG (ref 275–295)
PLATELET # BLD AUTO: 284 10(3)UL (ref 150–450)
POTASSIUM SERPL-SCNC: 4.5 MMOL/L (ref 3.5–5.1)
RBC # BLD AUTO: 6.32 X10(6)UL (ref 4.3–5.7)
SODIUM SERPL-SCNC: 136 MMOL/L (ref 136–145)
WBC # BLD AUTO: 7.6 X10(3) UL (ref 4–11)

## 2020-09-15 PROCEDURE — 99253 IP/OBS CNSLTJ NEW/EST LOW 45: CPT | Performed by: OTHER

## 2020-09-15 RX ORDER — ENOXAPARIN SODIUM 100 MG/ML
40 INJECTION SUBCUTANEOUS DAILY
Status: DISCONTINUED | OUTPATIENT
Start: 2020-09-16 | End: 2020-09-19

## 2020-09-15 NOTE — CHRONIC PAIN
[facility]  Inpatient Pain Management Progress Note      Patient name: Kusum Aleman 48year old male  : 1965  MRN: [id number]    Diagnosis: Intractable low back pain  (primary encounter diagnosis)  Lumbar radiculopathy    Reason for stated age, appropriate disposition and demeanor, answers questions appropriately   Head: normocephalic, atraumatic  Eyes: anicteric; no injection  Ears: no obvious deformities noted   Nose: externally grossly within normal limits, no unusual discharge or

## 2020-09-15 NOTE — PLAN OF CARE
Patient received from radiology department. Radiology department was unable to do the lumbar puncture, will try to do procedure on tomorrow. Patient resting comfortable in the bed. Vital signs taken and stable. Call light within reach.  Patient will be maico

## 2020-09-15 NOTE — OCCUPATIONAL THERAPY NOTE
OCCUPATIONAL THERAPY TREATMENT NOTE - INPATIENT        Room Number: 800/664-Z        Presenting Problem: (fall, LBP, LE numbness)    Problem List  Principal Problem:    Intractable low back pain  Active Problems:    Lumbar radiculopathy      OCCUPATIONAL T Restriction: None       PAIN ASSESSMENT  Rating: (did not rate, eports improved since epidural injection)  Location: (low back)  Management Techniques:  Activity promotion;Relaxation;Repositioning     ACTIVITY TOLERANCE  Pt performs all requested tasks marte

## 2020-09-15 NOTE — PLAN OF CARE
Problem: Patient Centered Care  Goal: Patient preferences are identified and integrated in the patient's plan of care  Description  Interventions:  - What would you like us to know as we care for you?  Back pain started about 2 weeks ago  - Provide timely frequently for physical needs  - Identify cognitive and physical deficits and behaviors that affect risk of falls.   - [place] fall precautions as indicated by assessment.  - Educate pt/family on patient safety including physical limitations  - Instruct p

## 2020-09-15 NOTE — PROGRESS NOTES
[facility] Hospitalist Team  Progress Note    Audrey Goldstein Patient Status:  Observation    1965 MRN [id number]   Location [facility] 5SW/SE Attending Rosaura Martínez MD   Hosp Day # 0 PCP Cuca Sibley MD     CC: Follow Up  PCP: Cuca Sibley MD No back pain. Still unable to bear weight on left leg, right leg better. Still feels like he has buckling of legs. Noted left foot drop.  Denies back pain       OBJECTIVE:   Blood pressure (!) 176/85, pulse 87, temperature 98.4 °F (36.9 °C), temperature s PRN          IMAGING     Mri Spine Lumbar (cpt=72148)    Result Date: 9/12/2020  CONCLUSION:  1. Evaluation degraded by patient related motion artifact. 2. Multilevel degenerative disc disease/spondylosis and additional facet arthrosis at L3-4 and L4-5.   Eliecer Richardson

## 2020-09-15 NOTE — CONSULTS
[facility]    Report of Consultation    Luis Conner Patient Status:  Observation    1965 MRN [id number]   Location [facility] 5SW/SE Attending Cr Leung MD   Hosp Day # 0 PCP Nathan Man MD     Date of Admission:   lidocaine-menthol 4-1 % 1 patch, 1 patch, Transdermal, Daily  [START ON 9/16/2020] Enoxaparin Sodium (LOVENOX) 40 MG/0.4ML injection 40 mg, 40 mg, Subcutaneous, Daily  immune globulin (GAMMAGARD) 10 % infusion 30 g, 30 g, Intravenous, Daily  acetaminophen and concentration  Thought process intact  Memory intact- recent and remote  Mood intact  Fund of knowledge appropriate for education and age    Language intact including: comprehension, naming, repetition, vocabulary    Cranial Nerves:  II.- Visual fields 09/12/2020    CRP <0.29 09/12/2020         Imaging:    [facility] Fluoroscopy - N/c    Result Date: 9/14/2020  CONCLUSION: Fluoroscopic guidance as above.  58.0-seconds of fluoroscopy time were used.   A single (x1) fluoroscopic image as well as a 1 pag

## 2020-09-15 NOTE — PLAN OF CARE
A&Ox4. S/p lumbar epidural steroid injection. Denied any pain after PRN Norco was given earlier in the evening. Numbness and tingling to bilateral feet persisted. Fall precautions are in place.     Problem: Patient Centered Care  Goal: Patient preferences a Anticipate increased pain with activity and pre-medicate as appropriate  Outcome: Progressing     Problem: SAFETY ADULT - FALL  Goal: Free from fall injury  Description  INTERVENTIONS:  - Assess pt frequently for physical needs  - Identify cognitive and ph

## 2020-09-15 NOTE — PROGRESS NOTES
Agree w above       S: s/p injection     O:  Exam  Gen: No acute distress, alert and oriented x3, no focal neurologic deficits  Heent: NC AT, mucous memb clear, PERRLA, neck supple  Pulm: Lungs clear, normal respiratory effort  CV: Heart with regular rate

## 2020-09-15 NOTE — CM/SW NOTE
APN order to  for d/c planning. Pt admitted 9/11/2020 for ntractable LBP, bilateral leg numbness and falls;  found to have acute lumbar radiculopathy .    Per chart review and multidisciplinary rounds, pt lives with his spouse in a second floor apartment

## 2020-09-15 NOTE — PHYSICAL THERAPY NOTE
PHYSICAL THERAPY TREATMENT NOTE - INPATIENT     Room Number: 106/269-F       Presenting Problem: admitted due to c/o intractable LBP, bilateral leg numbness and falls;  found to have acute lumbar radiculopathy    Problem List  Principal Problem:    Intract Discharge Recommendations: Acute rehabilitation     PLAN  PT Treatment Plan: Bed mobility; Body mechanics; Patient education; Family education; Endurance;Gait training;Strengthening;Stair training;Transfer training;Balance training    SUBJECTIVE  'My pain is b AB/AD  LAQ     Position Sitting       Patient End of Session: Up in chair;Call light within reach; Needs met;RN aware of session/findings; All patient questions and concerns addressed; Alarm set; Family present    CURRENT GOALS   Goals to be met by:  9-23-20

## 2020-09-16 ENCOUNTER — APPOINTMENT (OUTPATIENT)
Dept: GENERAL RADIOLOGY | Facility: HOSPITAL | Age: 55
DRG: 519 | End: 2020-09-16
Attending: EMERGENCY MEDICINE
Payer: MEDICAID

## 2020-09-16 LAB
ALBUMIN SERPL-MCNC: 3.7 G/DL (ref 3.4–5)
ALP LIVER SERPL-CCNC: 125 U/L (ref 45–117)
ALT SERPL-CCNC: 76 U/L (ref 16–61)
ANION GAP SERPL CALC-SCNC: 3 MMOL/L (ref 0–18)
AST SERPL-CCNC: 65 U/L (ref 15–37)
BASOPHILS # BLD AUTO: 0.03 X10(3) UL (ref 0–0.2)
BASOPHILS NFR BLD AUTO: 0.5 %
BILIRUB DIRECT SERPL-MCNC: 0.1 MG/DL (ref 0–0.2)
BILIRUB SERPL-MCNC: 0.3 MG/DL (ref 0.1–2)
BUN BLD-MCNC: 19 MG/DL (ref 7–18)
BUN/CREAT SERPL: 13.9 (ref 10–20)
CALCIUM BLD-MCNC: 9.6 MG/DL (ref 8.5–10.1)
CHLORIDE SERPL-SCNC: 97 MMOL/L (ref 98–112)
CO2 SERPL-SCNC: 32 MMOL/L (ref 21–32)
CREAT BLD-MCNC: 1.37 MG/DL (ref 0.7–1.3)
D DIMER PPP FEU-MCNC: 0.4 UG/ML FEU (ref ?–0.54)
DEPRECATED RDW RBC AUTO: 37.6 FL (ref 35.1–46.3)
EOSINOPHIL # BLD AUTO: 0.06 X10(3) UL (ref 0–0.7)
EOSINOPHIL NFR BLD AUTO: 1.1 %
ERYTHROCYTE [DISTWIDTH] IN BLOOD BY AUTOMATED COUNT: 13.5 % (ref 11–15)
GLUCOSE BLD-MCNC: 138 MG/DL (ref 70–99)
HCT VFR BLD AUTO: 46.7 % (ref 39–53)
HGB BLD-MCNC: 16.6 G/DL (ref 13–17.5)
IMM GRANULOCYTES # BLD AUTO: 0.01 X10(3) UL (ref 0–1)
IMM GRANULOCYTES NFR BLD: 0.2 %
LIPASE SERPL-CCNC: 178 U/L (ref 73–393)
LYMPHOCYTES # BLD AUTO: 1.79 X10(3) UL (ref 1–4)
LYMPHOCYTES NFR BLD AUTO: 31.7 %
M PROTEIN MFR SERPL ELPH: 9.1 G/DL (ref 6.4–8.2)
MCH RBC QN AUTO: 27.9 PG (ref 26–34)
MCHC RBC AUTO-ENTMCNC: 35.5 G/DL (ref 31–37)
MCV RBC AUTO: 78.4 FL (ref 80–100)
MONOCYTES # BLD AUTO: 0.6 X10(3) UL (ref 0.1–1)
MONOCYTES NFR BLD AUTO: 10.6 %
NEUTROPHILS # BLD AUTO: 3.15 X10 (3) UL (ref 1.5–7.7)
NEUTROPHILS # BLD AUTO: 3.15 X10(3) UL (ref 1.5–7.7)
NEUTROPHILS NFR BLD AUTO: 55.9 %
OSMOLALITY SERPL CALC.SUM OF ELEC: 278 MOSM/KG (ref 275–295)
PLATELET # BLD AUTO: 236 10(3)UL (ref 150–450)
POTASSIUM SERPL-SCNC: 4 MMOL/L (ref 3.5–5.1)
RBC # BLD AUTO: 5.96 X10(6)UL (ref 4.3–5.7)
SODIUM SERPL-SCNC: 132 MMOL/L (ref 136–145)
TROPONIN I SERPL-MCNC: <0.045 NG/ML (ref ?–0.04)
TROPONIN I SERPL-MCNC: <0.045 NG/ML (ref ?–0.04)
WBC # BLD AUTO: 5.6 X10(3) UL (ref 4–11)

## 2020-09-16 PROCEDURE — 71045 X-RAY EXAM CHEST 1 VIEW: CPT | Performed by: EMERGENCY MEDICINE

## 2020-09-16 PROCEDURE — 99232 SBSQ HOSP IP/OBS MODERATE 35: CPT | Performed by: OTHER

## 2020-09-16 RX ORDER — NITROGLYCERIN 0.4 MG/1
TABLET SUBLINGUAL
Status: COMPLETED
Start: 2020-09-16 | End: 2020-09-16

## 2020-09-16 RX ORDER — METOPROLOL TARTRATE 5 MG/5ML
5 INJECTION INTRAVENOUS ONCE
Status: COMPLETED | OUTPATIENT
Start: 2020-09-16 | End: 2020-09-16

## 2020-09-16 RX ORDER — ASPIRIN 81 MG/1
TABLET, CHEWABLE ORAL
Status: COMPLETED
Start: 2020-09-16 | End: 2020-09-16

## 2020-09-16 RX ORDER — MORPHINE SULFATE 2 MG/ML
2 INJECTION, SOLUTION INTRAMUSCULAR; INTRAVENOUS ONCE
Status: COMPLETED | OUTPATIENT
Start: 2020-09-16 | End: 2020-09-16

## 2020-09-16 RX ORDER — MAGNESIUM HYDROXIDE/ALUMINUM HYDROXICE/SIMETHICONE 120; 1200; 1200 MG/30ML; MG/30ML; MG/30ML
30 SUSPENSION ORAL ONCE
Status: COMPLETED | OUTPATIENT
Start: 2020-09-16 | End: 2020-09-16

## 2020-09-16 RX ORDER — PREGABALIN 50 MG/1
50 CAPSULE ORAL 3 TIMES DAILY
Status: DISCONTINUED | OUTPATIENT
Start: 2020-09-16 | End: 2020-09-21 | Stop reason: HOSPADM

## 2020-09-16 RX ORDER — ASPIRIN 325 MG
325 TABLET, DELAYED RELEASE (ENTERIC COATED) ORAL ONCE
Status: DISCONTINUED | OUTPATIENT
Start: 2020-09-16 | End: 2020-09-18

## 2020-09-16 RX ORDER — NITROGLYCERIN 0.4 MG/1
0.4 TABLET SUBLINGUAL EVERY 5 MIN PRN
Status: DISCONTINUED | OUTPATIENT
Start: 2020-09-16 | End: 2020-09-18

## 2020-09-16 RX ORDER — MORPHINE SULFATE 2 MG/ML
INJECTION, SOLUTION INTRAMUSCULAR; INTRAVENOUS
Status: COMPLETED
Start: 2020-09-16 | End: 2020-09-16

## 2020-09-16 RX ORDER — MAGNESIUM HYDROXIDE/ALUMINUM HYDROXICE/SIMETHICONE 120; 1200; 1200 MG/30ML; MG/30ML; MG/30ML
SUSPENSION ORAL
Status: COMPLETED
Start: 2020-09-16 | End: 2020-09-16

## 2020-09-16 NOTE — CHRONIC PAIN
[facility]  Inpatient Pain Management Progress Note      Patient name: Catherine Armstrong 48year old male  : 1965  MRN: [id number]    Diagnosis: Intractable low back pain  (primary encounter diagnosis)  Lumbar radiculopathy    Reason for 16, height 5' 11\" (1.803 m), weight 222 lb (100.7 kg), SpO2 98 %.     General: Alert and oriented x3, NAD, appears stated age, appropriate disposition and demeanor, answers questions appropriately   Head: normocephalic, atraumatic  Eyes: anicteric; no inje Medicine

## 2020-09-16 NOTE — PROGRESS NOTES
[facility] Hospitalist Team  Progress Note    Socorro Nine Patient Status:  Observation    1965 MRN [id number]   Location [facility] 5SW/SE Attending Hayley Reveles MD   Hosp Day # 0 PCP Karen Sevilla MD     CC: Follow Up  PCP: Karen Sevilla MD present. Getting ready to take shower. Was unable to tolerate LP. On day 2 of IVIG. Per pt no change in his strength but per wife improved R>>L and has been up walking with walker with her.  Overall pt strength appears to be improved from my point      OBJE PRN  HYDROcodone-acetaminophen (NORCO) 5-325 MG per tab 1 tablet, 1 tablet, Oral, Q4H PRN    Or  HYDROcodone-acetaminophen (NORCO) 5-325 MG per tab 2 tablet, 2 tablet, Oral, Q4H PRN  cyclobenzaprine (FLEXERIL) tab 10 mg, 10 mg, Oral, TID PRN  HYDROcodone-a

## 2020-09-16 NOTE — PROGRESS NOTES
[facility] AND [facility]  Neurology Progress Note    Christiana Preciado Patient Status:  Observation    1965 MRN [id number]   Location [facility] 5SW/SE Attending Mansoor Cameron MD   Hosp Day # 0 PCP Jovan Navarro MD     Subjective:  Christiana Preciado is a(n x3.  Normal attention span and concentration  Thought process intact  Memory intact- recent and remote  Mood intact  Fund of knowledge appropriate for education and age    Language intact including: comprehension, naming, repetition, vocabulary    Cranial page-dose summary image is stored with this exam.  Dictated by (CST): Elidia Cook MD on 9/14/2020 at 2:29 PM     Finalized by (CST): Elidia Cook MD on 9/14/2020 at 2:29 PM                  Assessment:  Patient Active Problem List:     Tobacco use     Int

## 2020-09-16 NOTE — PROGRESS NOTES
Agree w above        S: pain improved but could not tolerate LP     O:  Exam  Gen: No acute distress, alert and oriented x3, no focal neurologic deficits  Heent: NC AT, mucous memb clear, PERRLA, neck supple  Pulm: Lungs clear, normal respiratory effort  C

## 2020-09-16 NOTE — PLAN OF CARE
Patient is alert and oriented x4. He had received his first dose of immune globulin yesterday and tolerated well. He no longer complaints of numbness and tingling to his BLE. Weakness, especially to his left lower leg, still persists to BLE.  PRN Norco was pain management  - Manage/alleviate anxiety  - Utilize distraction and/or relaxation techniques  - Monitor for opioid side effects  - Notify MD/LIP if interventions unsuccessful or patient reports new pain  - Anticipate increased pain with activity and pre

## 2020-09-16 NOTE — PHYSICAL THERAPY NOTE
Pt away from room with spouse in w/c. Pt will re-attempt 9/16/2020 as pt availability allows. Current recommendations are for Acute rehab with PT /OT to regain maximal PLOF and safety.

## 2020-09-16 NOTE — PLAN OF CARE
VSS. Pt strength getting better. IVIG complete for today. Lovenox started.   Problem: Patient Centered Care  Goal: Patient preferences are identified and integrated in the patient's plan of care  Description  Interventions:  - What would you like us to know FALL  Goal: Free from fall injury  Description  INTERVENTIONS:  - Assess pt frequently for physical needs  - Identify cognitive and physical deficits and behaviors that affect risk of falls.   - [place] fall precautions as indicated by assessment.  - Educ

## 2020-09-17 ENCOUNTER — APPOINTMENT (OUTPATIENT)
Dept: CV DIAGNOSTICS | Facility: HOSPITAL | Age: 55
DRG: 519 | End: 2020-09-17
Attending: INTERNAL MEDICINE
Payer: MEDICAID

## 2020-09-17 LAB
ANION GAP SERPL CALC-SCNC: 2 MMOL/L (ref 0–18)
BASOPHILS # BLD AUTO: 0.04 X10(3) UL (ref 0–0.2)
BASOPHILS NFR BLD AUTO: 0.8 %
BUN BLD-MCNC: 20 MG/DL (ref 7–18)
BUN/CREAT SERPL: 14 (ref 10–20)
CALCIUM BLD-MCNC: 9.4 MG/DL (ref 8.5–10.1)
CHLORIDE SERPL-SCNC: 100 MMOL/L (ref 98–112)
CHOLEST SMN-MCNC: 132 MG/DL (ref ?–200)
CO2 SERPL-SCNC: 32 MMOL/L (ref 21–32)
CREAT BLD-MCNC: 1.43 MG/DL (ref 0.7–1.3)
DEPRECATED RDW RBC AUTO: 38.1 FL (ref 35.1–46.3)
EOSINOPHIL # BLD AUTO: 0.1 X10(3) UL (ref 0–0.7)
EOSINOPHIL NFR BLD AUTO: 2.1 %
ERYTHROCYTE [DISTWIDTH] IN BLOOD BY AUTOMATED COUNT: 13.5 % (ref 11–15)
GLUCOSE BLD-MCNC: 96 MG/DL (ref 70–99)
HCT VFR BLD AUTO: 44.7 % (ref 39–53)
HDLC SERPL-MCNC: 45 MG/DL (ref 40–59)
HGB BLD-MCNC: 15.8 G/DL (ref 13–17.5)
IMM GRANULOCYTES # BLD AUTO: 0.01 X10(3) UL (ref 0–1)
IMM GRANULOCYTES NFR BLD: 0.2 %
LDLC SERPL CALC-MCNC: 56 MG/DL (ref ?–100)
LYMPHOCYTES # BLD AUTO: 1.63 X10(3) UL (ref 1–4)
LYMPHOCYTES NFR BLD AUTO: 33.7 %
MCH RBC QN AUTO: 27.7 PG (ref 26–34)
MCHC RBC AUTO-ENTMCNC: 35.3 G/DL (ref 31–37)
MCV RBC AUTO: 78.3 FL (ref 80–100)
MONOCYTES # BLD AUTO: 0.91 X10(3) UL (ref 0.1–1)
MONOCYTES NFR BLD AUTO: 18.8 %
NEUTROPHILS # BLD AUTO: 2.14 X10 (3) UL (ref 1.5–7.7)
NEUTROPHILS # BLD AUTO: 2.14 X10(3) UL (ref 1.5–7.7)
NEUTROPHILS NFR BLD AUTO: 44.4 %
NONHDLC SERPL-MCNC: 87 MG/DL (ref ?–130)
OSMOLALITY SERPL CALC.SUM OF ELEC: 280 MOSM/KG (ref 275–295)
PLATELET # BLD AUTO: 203 10(3)UL (ref 150–450)
POTASSIUM SERPL-SCNC: 4.5 MMOL/L (ref 3.5–5.1)
RBC # BLD AUTO: 5.71 X10(6)UL (ref 4.3–5.7)
SODIUM SERPL-SCNC: 134 MMOL/L (ref 136–145)
TRIGL SERPL-MCNC: 157 MG/DL (ref 30–149)
TROPONIN I SERPL-MCNC: <0.045 NG/ML (ref ?–0.04)
VLDLC SERPL CALC-MCNC: 31 MG/DL (ref 0–30)
WBC # BLD AUTO: 4.8 X10(3) UL (ref 4–11)

## 2020-09-17 PROCEDURE — 93306 TTE W/DOPPLER COMPLETE: CPT | Performed by: INTERNAL MEDICINE

## 2020-09-17 PROCEDURE — 99232 SBSQ HOSP IP/OBS MODERATE 35: CPT | Performed by: OTHER

## 2020-09-17 NOTE — PROGRESS NOTES
Spine Service Progress Note    24hrs/Events: Patient received epidural steroid injection with anesthesiology. Subjective: Pain substantially better after epidural steoid injection. Patient still with difficulty with supine positioning.  Much more co

## 2020-09-17 NOTE — CM/SW NOTE
[facility] out of Network  With patients insurance    Faxed referral to Neri Rodriguez  Phone [phone]  Fx. [phone]  Waiting period is 2 weeks.           Case Management /Progression of Care    MDO received for Day Reha

## 2020-09-17 NOTE — PROGRESS NOTES
[facility]  Neurology Progress Note    Mp Salazar Patient Status:  Observation    1965 MRN [id number]   Location [facility] 5SW/SE Attending Cristhian Villalta MD   Hosp Day # 1 PCP Fani Hill MD     Subjective:  Mp Salazar is a(n pulses were palpable and normal, no cyanosis or edema     Neurological:     Mental Status- Alert and oriented x3.   Normal attention span and concentration  Thought process intact  Memory intact- recent and remote  Mood intact  Fund of knowledge appropriate 09/12/2020    CRP <0.29 09/12/2020    TROP <0.045 09/17/2020       Xr Chest Ap Portable  (cpt=71045)    Result Date: 9/16/2020  CONCLUSION:   Negative for radiographically evident acute intrathoracic process.    Dictated by (CST): Johnnie Rees MD on 9/16

## 2020-09-17 NOTE — PAYOR COMM NOTE
--------------  ADMISSION REVIEW     Payor: Jacob Vergara #:  [id number]  Authorization Number: Ignacio Miguel date: 9/16/20  Admit time: 1009       Admit Orders (From admission, onward)     Start     Ordered light.   Neck: Normal range of motion. Neck supple. Cardiovascular: Normal rate, regular rhythm and intact distal pulses. Pulmonary/Chest: Effort normal. No respiratory distress. Abdominal: Soft. There is no tenderness. There is no guarding.    Muscu consulted    Intractable LBP with Radiculopathy  -seen in ER 9/5 with xray with DDD  -home meds- medrol dose pack, flexeril, norco, ibuprofen, lidoderm ptach   -prn pain meds-norco, morphine, flexeril, valium  -lidoderm patch  -prn heat  -hold off on furth pertinent surgical history.      ALL:  No Known Allergies     Lab 09/11/20  1138   WBC 10.5   HGB 16.5   MCV 77.2*   .0     Lab 09/11/20  1138   *   K 3.7      CO2 29.0   BUN 15   CREATSERUM 1.28   GLU 93   CA 9.7          9/12/20  Assess with low back pain with radicular symptoms, MRI and orthospine consulted, see below for details     Intractable LBP with Radiculopathy  -seen in ER 9/5 with xray with DDD  -home meds- medrol dose pack, flexeril, norco, ibuprofen, lidoderm ptach   -prn pain Findings combined with developmental central narrowing of the lumbar canal result in moderate central narrowing at L3-4, mild to moderate central narrowing at L4-5.   Mild central narrowing at L1-2 and L2-3 is predominantly developmental.  Mild multilevel f overweight  NEURO: A/A Ox3, left leg weakness with foot drop  RESP: non labored, CTA  CARDIO: Regular, no murmur  ABD: soft, NT, ND, BS+  EXTREMITIES: no edema     Lab 09/11/20  1138 09/12/20  0728 09/14/20  0747   WBC 10.5 12.3*  --    HGB 16.5 16.2  --    Mental Status- Alert and oriented x3.   Normal attention span and concentration  Thought process intact  Memory intact- recent and remote  Mood intact  Fund of knowledge appropriate for education and age     Language intact including: comprehension, na pain with radicular symptoms, MRI with some disc disease but limited study due to motion artifact, who is S/P lumbar epidural injection, issues with ongoing left leg weakness and foot drop, neurology consulted, unable to tolerate LP, pt being tx with IVIG 16.5 16.2  --  17.6* 16.6   MCV 77.2* 77.2*  --  78.6* 78.4*   .0 257.0  --  284.0 236.0   INR  --   --  0.86*  --   --       Lab 09/11/20  1138 09/12/20  0728 09/12/20 2038 09/13/20  0710 09/15/20  0758 09/16/20  0742   * 137  --   --  136 1    Prophy  -SCD  -lovenox     SUBJECTIVE:   RRT for right sided chest pain last night about 10 minutes, resolved, pt not sure if any meds helped. No further chest pain now. EKG and cardiac enzymes negative.  Stronger in his legs, left still weaker then r 2222 Given 2 tablet Oral       immune globulin (GAMMAGARD) 10 % infusion 30 g     Date Action Dose Route     9/16/2020 1212 New Bag 30 g Intravenous       lidocaine-menthol 4-1 % 1 patch     Date Action Dose Route     9/17/2020 0830 Patch Applied 1 patch T

## 2020-09-17 NOTE — PLAN OF CARE
Problem: Patient Centered Care  Goal: Patient preferences are identified and integrated in the patient's plan of care  Description  Interventions:  - What would you like us to know as we care for you?  Back pain started about 2 weeks ago  - Provide timely frequently for physical needs  - Identify cognitive and physical deficits and behaviors that affect risk of falls.   - [place] fall precautions as indicated by assessment.  - Educate pt/family on patient safety including physical limitations  - Instruct p

## 2020-09-17 NOTE — PLAN OF CARE
Problem: Patient Centered Care  Goal: Patient preferences are identified and integrated in the patient's plan of care  Description  Interventions:  - What would you like us to know as we care for you?  Back pain started about 2 weeks ago  - Provide timely frequently for physical needs  - Identify cognitive and physical deficits and behaviors that affect risk of falls.   - [place] fall precautions as indicated by assessment.  - Educate pt/family on patient safety including physical limitations  - Instruct p

## 2020-09-17 NOTE — CHRONIC PAIN
[facility]  Inpatient Pain Management Progress Note      Patient name: Ladena Primrose 48year old male  : 1965  MRN: [id number]    Diagnosis: Intractable low back pain  (primary encounter diagnosis)  Lumbar radiculopathy    Reason for HYDROcodone-acetaminophen    Exam:Blood pressure 123/82, pulse 99, temperature 98.1 °F (36.7 °C), temperature source Oral, resp. rate 18, height 5' 11\" (1.803 m), weight 222 lb (100.7 kg), SpO2 99 %.     General: Alert and oriented x3, NAD, appears stated therapy, and treatment options), face to face time, time spent reviewing data, obtaining patient information and discussing the care with the patients health care providers.       Anesthesiology  Pain Medicine

## 2020-09-17 NOTE — PROGRESS NOTES
RRT    *See RRT Documentation Record*    Reason the RRT was called: Severe chest pain    Assessment of patient leading up to RRT: Patient had complained of severe chest pain.  Upon assessment, he was sitting up and clutching his chest. He initially thought

## 2020-09-17 NOTE — SIGNIFICANT EVENT
Rapid response called for 8/10 midsternal chest pain described as pressure. No shortness of breath. No radiation to neck back or jaw. Feels like heartburn to patient.     BP (!) 175/105 (BP Location: Right arm)   Pulse 93   Temp 98.2 °F (36.8 °C) (Oral)

## 2020-09-17 NOTE — CONSULTS
Reason for Consultation: chest pain    Assessment/Plan:     1. Chest pain  - no evidence of ACS  - normal d-dimer  - likely M-S   - smoker  2.  GBS (Guillain [place] Syndrome)        PLAN:  - Echo  - Kamala      HPI:     Hillary Mahajan is a 48year old male who i Daily  Enoxaparin Sodium (LOVENOX) 40 MG/0.4ML injection 40 mg, 40 mg, Subcutaneous, Daily  acetaminophen (TYLENOL EXTRA STRENGTH) tab 1,000 mg, 1,000 mg, Oral, Q6H PRN  Normal Saline Flush 0.9 % injection 3 mL, 3 mL, Intravenous, PRN  HYDROcodone-acetamin SKIN:no rashes,lesions. NEURO/PSYCH:alert and oriented. Normal affect. CV:   PALP:PMI not displaced; no lifts, thrills or rubs. AUSC:regular rhythm; normal S1, S2 without S3; no significant murmur.    CAROTIDS:carotid pulses normal.   ABD AORTA:not e

## 2020-09-17 NOTE — PROGRESS NOTES
[facility] Hospitalist Team  Progress Note    Ira Napier Patient Status:  Observation    1965 MRN [id number]   Location [facility] 5SW/SE Attending Sd Bowens MD   Hosp Day # 1 PCP Taylor Ross MD     CC: Follow Up  PCP: Taylor Ross MD patient. Patient agrees with plan as detailed above.  Discussed plan of care with Dr. Leticia Alberts RN, NP   Agnes  Hospitalist Team  Pager [phone]   Answering Service number: [phone]  9/17/2020    SUBJECTIVE:   RRT for right Labs   Lab 09/16/20 2050 09/16/20 2246 09/17/20  0457   TROP <0.045 <0.045 <0.045           MEDICATIONS      immune globulin (GAMMAGARD) 10 % infusion 30 g, 30 g, Intravenous, Daily  pregabalin (LYRICA) cap 50 mg, 50 mg, Oral, TID  aspirin EC EC tab 325 99   Temp 98 °F (36.7 °C) (Oral)   Resp 18   Ht 5' 11\" (1.803 m)   Wt 222 lb (100.7 kg)   SpO2 99%   BMI 30.96 kg/m²     Gen: No acute distress, alert and oriented x3  Neck Supple,   Pulm: Lungs clear, normal respiratory effort   CV: Heart with regular ra

## 2020-09-18 ENCOUNTER — APPOINTMENT (OUTPATIENT)
Dept: NUCLEAR MEDICINE | Facility: HOSPITAL | Age: 55
DRG: 519 | End: 2020-09-18
Attending: INTERNAL MEDICINE
Payer: MEDICAID

## 2020-09-18 LAB
ANION GAP SERPL CALC-SCNC: 0 MMOL/L (ref 0–18)
BASOPHILS # BLD AUTO: 0.04 X10(3) UL (ref 0–0.2)
BASOPHILS NFR BLD AUTO: 1 %
BUN BLD-MCNC: 20 MG/DL (ref 7–18)
BUN/CREAT SERPL: 14 (ref 10–20)
CALCIUM BLD-MCNC: 9.3 MG/DL (ref 8.5–10.1)
CHLORIDE SERPL-SCNC: 100 MMOL/L (ref 98–112)
CO2 SERPL-SCNC: 33 MMOL/L (ref 21–32)
CREAT BLD-MCNC: 1.43 MG/DL (ref 0.7–1.3)
DEPRECATED RDW RBC AUTO: 38.7 FL (ref 35.1–46.3)
EOSINOPHIL # BLD AUTO: 0.05 X10(3) UL (ref 0–0.7)
EOSINOPHIL NFR BLD AUTO: 1.2 %
ERYTHROCYTE [DISTWIDTH] IN BLOOD BY AUTOMATED COUNT: 13.7 % (ref 11–15)
GLUCOSE BLD-MCNC: 100 MG/DL (ref 70–99)
HCT VFR BLD AUTO: 44.8 % (ref 39–53)
HGB BLD-MCNC: 15.8 G/DL (ref 13–17.5)
IMM GRANULOCYTES # BLD AUTO: 0.01 X10(3) UL (ref 0–1)
IMM GRANULOCYTES NFR BLD: 0.2 %
LYMPHOCYTES # BLD AUTO: 1.19 X10(3) UL (ref 1–4)
LYMPHOCYTES NFR BLD AUTO: 29.6 %
MCH RBC QN AUTO: 27.8 PG (ref 26–34)
MCHC RBC AUTO-ENTMCNC: 35.3 G/DL (ref 31–37)
MCV RBC AUTO: 78.7 FL (ref 80–100)
MONOCYTES # BLD AUTO: 0.95 X10(3) UL (ref 0.1–1)
MONOCYTES NFR BLD AUTO: 23.6 %
NEUTROPHILS # BLD AUTO: 1.78 X10 (3) UL (ref 1.5–7.7)
NEUTROPHILS # BLD AUTO: 1.78 X10(3) UL (ref 1.5–7.7)
NEUTROPHILS NFR BLD AUTO: 44.4 %
OSMOLALITY SERPL CALC.SUM OF ELEC: 279 MOSM/KG (ref 275–295)
PLATELET # BLD AUTO: 179 10(3)UL (ref 150–450)
POTASSIUM SERPL-SCNC: 4.4 MMOL/L (ref 3.5–5.1)
RBC # BLD AUTO: 5.69 X10(6)UL (ref 4.3–5.7)
SODIUM SERPL-SCNC: 133 MMOL/L (ref 136–145)
WBC # BLD AUTO: 4 X10(3) UL (ref 4–11)

## 2020-09-18 PROCEDURE — 99232 SBSQ HOSP IP/OBS MODERATE 35: CPT | Performed by: OTHER

## 2020-09-18 RX ORDER — TRAMADOL HYDROCHLORIDE 50 MG/1
50 TABLET ORAL EVERY 6 HOURS PRN
Status: DISCONTINUED | OUTPATIENT
Start: 2020-09-18 | End: 2020-09-21 | Stop reason: HOSPADM

## 2020-09-18 RX ORDER — ONDANSETRON 2 MG/ML
4 INJECTION INTRAMUSCULAR; INTRAVENOUS EVERY 6 HOURS PRN
Status: DISCONTINUED | OUTPATIENT
Start: 2020-09-18 | End: 2020-09-21 | Stop reason: HOSPADM

## 2020-09-18 RX ORDER — GABAPENTIN 300 MG/1
300 CAPSULE ORAL NIGHTLY
Status: DISCONTINUED | OUTPATIENT
Start: 2020-09-18 | End: 2020-09-21 | Stop reason: HOSPADM

## 2020-09-18 NOTE — PHYSICAL THERAPY NOTE
PHYSICAL THERAPY TREATMENT NOTE - INPATIENT     Room Number: 372/490-K       Presenting Problem: admitted due to c/o intractable LBP, bilateral leg numbness and falls;  found to have acute lumbar radiculopathy    Problem List  Principal Problem:    Intract RECOMMENDATIONS  PT Discharge Recommendations: Acute rehabilitation     PLAN  PT Treatment Plan: Bed mobility; Body mechanics; Patient education; Family education; Endurance;Gait training;Strengthening;Stair training;Transfer training;Balance training    SUBJE excessive leaning on R.W., L foot drop and lack of terminal knee extension. Close chair follow due to pt needing breaks every 20' or so due to severe pain in R lower flank.   Exercises:  Seated B LE LAQ's with AAROM x 10 EOB    Patient End of Session: Up i

## 2020-09-18 NOTE — PLAN OF CARE
IVIG running. Pt not complaining of pain at this time. Plan for MRI spine tomorrow. NPO at midnight.      Problem: Patient Centered Care  Goal: Patient preferences are identified and integrated in the patient's plan of care  Description  Interventions:  - W Problem: SAFETY ADULT - FALL  Goal: Free from fall injury  Description  INTERVENTIONS:  - Assess pt frequently for physical needs  - Identify cognitive and physical deficits and behaviors that affect risk of falls.   - [place] fall precautions as indica

## 2020-09-18 NOTE — PLAN OF CARE
Problem: Patient Centered Care  Goal: Patient preferences are identified and integrated in the patient's plan of care  Description  Interventions:  - What would you like us to know as we care for you?  Back pain started about 2 weeks ago  - Provide timely frequently for physical needs  - Identify cognitive and physical deficits and behaviors that affect risk of falls.   - [place] fall precautions as indicated by assessment.  - Educate pt/family on patient safety including physical limitations  - Instruct p

## 2020-09-18 NOTE — PROGRESS NOTES
[facility] Hospitalist Team  Progress Note    Delia Joiner Patient Status:  Observation    1965 MRN [id number]   Location [facility] 5SW/SE Attending Thuy Nguyen MD   Hosp Day # 2 PCP Shellie Albert MD     CC: Follow Up  PCP: Shellie Albert MD Hyponatremia  -Na down to 132-->134  -plans for work up tomorrow if persists     FEN  -lytes in am  -diet-general      Prophy  -SCD  -lovenox      Dispo  -pending clinical course  PCP: Vicente Cunningham MD     Concerns regarding plan of care were discus 09/16/20 2050   ALT 76*   AST 65*   ALB 3.7       No results for input(s): PGLU in the last 168 hours.     Recent Labs   Lab 09/16/20 2050 09/16/20 2246 09/17/20  0457   TROP <0.045 <0.045 <0.045           MEDICATIONS      gabapentin (NEURONTIN) cap 300 with regular rate and rhythm   Abd: Abdomen soft, nontender, nondistended   MSK:  no clubbing, no cyanosis   Skin: no rashes or lesions,   Psych: mood stable, cooperative  Neuro: 3/5 strength of prox right lower ext, 4/5 distal right lower ext, 4/5 prox le wheelchair.

## 2020-09-18 NOTE — CM/SW NOTE
12:00PM  SW met w/ pt in his room and updated that Ronn Blanchard 89 is OON w/ his insurance. SW informed him of [facility] and pt is [facility]. SW provided pt w/ address and phone # to [facility].     Pt is also requesting a wal pt's chart. Per RN, pt has MRI spine ordered. SW updated pt's RN of above and requested she reach out to therapy for walker. 03:00PM  Received Vmail from [facility] on chart.  EDILBERTO obtained copy and faxed to [facility] at: [phone]

## 2020-09-18 NOTE — PAYOR COMM NOTE
--------------  PT WAS IN OBSERVATION STATUS FOR 5 DAYS WITHOUT IMPROVEMENT. STATUS CHANGED TO INPT ON 9/16 TREATING PRESUMED GBS (Guillain [place] syndrome) WITH IVIG.      9/18 CONTINUED STAY REVIEW    Payor: [phone]  Sub Exam:  Light touch sensation- decreased sensation in the calves bilaterally     Deep Tendon Reflexes:  Biceps 2+ bilateral symmetric  Triceps 2+ bilateral symmetric  Brachioradialis 2 + bilateral symmetric  No reflexes obtainable in lower extremities     C

## 2020-09-18 NOTE — PROGRESS NOTES
[facility] AND [facility]  Neurology Progress Note    Harvey Cardona Patient Status:  Observation    1965 MRN [id number]   Location [facility] 5SW/SE Attending Joseph Gaona MD   Hosp Day # 2 PCP Lizz Persaud MD     Subjective:  Harvey Cardona is a(n redness or swelling  Neck- No masses or adenopathy  CV: pulses were palpable and normal, no cyanosis or edema     Neurological:     Mental Status- Alert and oriented x3.   Normal attention span and concentration  Thought process intact  Memory intact- recen 09/16/2020    PSA 0.769 09/06/2019    DDIMER 0.40 09/16/2020    ESRML 18 09/12/2020    CRP <0.29 09/12/2020    TROP <0.045 09/17/2020       Xr Chest Ap Portable  (cpt=71045)    Result Date: 9/16/2020  CONCLUSION:   Negative for radiographically evident acu

## 2020-09-18 NOTE — PROGRESS NOTES
Assessment and Plan:     1. Chest pain, atypical  - no evidence of ACS  - normal d-dimer  - likely M-S   - smoker  - Echo-normal  - unable to do Kamala  2.  GBS (Guillain [place] Syndrome)           PLAN:  - no further workup at this time  - Kamala as OP in fut Sinus Rhythm WITHIN NORMAL LIMITS When compared with ECG of 09/16/2020 21:04:01 No change Electronically signed on 09/17/2020 at 11:30 by Gayatri Reynoso MD    Ekg 12-lead    Result Date: 9/16/2020  ECG Report  Interpretation  -------------------------- Sin

## 2020-09-19 ENCOUNTER — ANESTHESIA (OUTPATIENT)
Dept: MRI IMAGING | Facility: HOSPITAL | Age: 55
DRG: 519 | End: 2020-09-19
Payer: MEDICAID

## 2020-09-19 ENCOUNTER — ANESTHESIA EVENT (OUTPATIENT)
Dept: MRI IMAGING | Facility: HOSPITAL | Age: 55
DRG: 519 | End: 2020-09-19
Payer: MEDICAID

## 2020-09-19 ENCOUNTER — APPOINTMENT (OUTPATIENT)
Dept: MRI IMAGING | Facility: HOSPITAL | Age: 55
DRG: 519 | End: 2020-09-19
Attending: HOSPITALIST
Payer: MEDICAID

## 2020-09-19 LAB
ANION GAP SERPL CALC-SCNC: <0 MMOL/L (ref 0–18)
BASOPHILS # BLD AUTO: 0.05 X10(3) UL (ref 0–0.2)
BASOPHILS NFR BLD AUTO: 0.9 %
BUN BLD-MCNC: 20 MG/DL (ref 7–18)
BUN/CREAT SERPL: 12.1 (ref 10–20)
CALCIUM BLD-MCNC: 9.5 MG/DL (ref 8.5–10.1)
CHLORIDE SERPL-SCNC: 100 MMOL/L (ref 98–112)
CO2 SERPL-SCNC: 33 MMOL/L (ref 21–32)
CREAT BLD-MCNC: 1.65 MG/DL
DEPRECATED RDW RBC AUTO: 38.7 FL (ref 35.1–46.3)
EOSINOPHIL # BLD AUTO: 0.02 X10(3) UL (ref 0–0.7)
EOSINOPHIL NFR BLD AUTO: 0.4 %
ERYTHROCYTE [DISTWIDTH] IN BLOOD BY AUTOMATED COUNT: 14.7 % (ref 11–15)
GLUCOSE BLD-MCNC: 101 MG/DL (ref 70–99)
HCT VFR BLD AUTO: 44.9 %
HGB BLD-MCNC: 15.9 G/DL
IMM GRANULOCYTES # BLD AUTO: 0.02 X10(3) UL (ref 0–1)
IMM GRANULOCYTES NFR BLD: 0.4 %
LYMPHOCYTES # BLD AUTO: 1.42 X10(3) UL (ref 1–4)
LYMPHOCYTES NFR BLD AUTO: 25.7 %
MCH RBC QN AUTO: 28.2 PG (ref 26–34)
MCHC RBC AUTO-ENTMCNC: 35.4 G/DL (ref 31–37)
MCV RBC AUTO: 79.8 FL
MONOCYTES # BLD AUTO: 1.71 X10(3) UL (ref 0.1–1)
MONOCYTES NFR BLD AUTO: 31 %
NEUTROPHILS # BLD AUTO: 2.3 X10 (3) UL (ref 1.5–7.7)
NEUTROPHILS # BLD AUTO: 2.3 X10(3) UL (ref 1.5–7.7)
NEUTROPHILS NFR BLD AUTO: 41.6 %
OSMOLALITY SERPL CALC.SUM OF ELEC: 277 MOSM/KG (ref 275–295)
PLATELET # BLD AUTO: 176 10(3)UL (ref 150–450)
POTASSIUM SERPL-SCNC: 4.8 MMOL/L (ref 3.5–5.1)
RBC # BLD AUTO: 5.63 X10(6)UL
SODIUM SERPL-SCNC: 132 MMOL/L (ref 136–145)
WBC # BLD AUTO: 5.5 X10(3) UL (ref 4–11)

## 2020-09-19 PROCEDURE — 99233 SBSQ HOSP IP/OBS HIGH 50: CPT | Performed by: OTHER

## 2020-09-19 PROCEDURE — 72157 MRI CHEST SPINE W/O & W/DYE: CPT | Performed by: HOSPITALIST

## 2020-09-19 PROCEDURE — B03BYZZ MAGNETIC RESONANCE IMAGING (MRI) OF SPINAL CORD USING OTHER CONTRAST: ICD-10-PCS | Performed by: HOSPITALIST

## 2020-09-19 PROCEDURE — 72158 MRI LUMBAR SPINE W/O & W/DYE: CPT | Performed by: HOSPITALIST

## 2020-09-19 RX ORDER — MORPHINE SULFATE 10 MG/ML
6 INJECTION, SOLUTION INTRAMUSCULAR; INTRAVENOUS EVERY 10 MIN PRN
Status: DISCONTINUED | OUTPATIENT
Start: 2020-09-19 | End: 2020-09-19 | Stop reason: HOSPADM

## 2020-09-19 RX ORDER — HYDROCODONE BITARTRATE AND ACETAMINOPHEN 5; 325 MG/1; MG/1
1 TABLET ORAL AS NEEDED
Status: DISCONTINUED | OUTPATIENT
Start: 2020-09-19 | End: 2020-09-19 | Stop reason: HOSPADM

## 2020-09-19 RX ORDER — MORPHINE SULFATE 4 MG/ML
4 INJECTION, SOLUTION INTRAMUSCULAR; INTRAVENOUS EVERY 10 MIN PRN
Status: DISCONTINUED | OUTPATIENT
Start: 2020-09-19 | End: 2020-09-19 | Stop reason: HOSPADM

## 2020-09-19 RX ORDER — NALOXONE HYDROCHLORIDE 0.4 MG/ML
80 INJECTION, SOLUTION INTRAMUSCULAR; INTRAVENOUS; SUBCUTANEOUS AS NEEDED
Status: DISCONTINUED | OUTPATIENT
Start: 2020-09-19 | End: 2020-09-19 | Stop reason: HOSPADM

## 2020-09-19 RX ORDER — EPHEDRINE SULFATE 50 MG/ML
INJECTION, SOLUTION INTRAVENOUS AS NEEDED
Status: DISCONTINUED | OUTPATIENT
Start: 2020-09-19 | End: 2020-09-19 | Stop reason: SURG

## 2020-09-19 RX ORDER — MORPHINE SULFATE 10 MG/ML
6 INJECTION, SOLUTION INTRAMUSCULAR; INTRAVENOUS EVERY 10 MIN PRN
Status: DISCONTINUED | OUTPATIENT
Start: 2020-09-19 | End: 2020-09-19

## 2020-09-19 RX ORDER — HALOPERIDOL 5 MG/ML
0.25 INJECTION INTRAMUSCULAR ONCE AS NEEDED
Status: DISCONTINUED | OUTPATIENT
Start: 2020-09-19 | End: 2020-09-19

## 2020-09-19 RX ORDER — HYDROMORPHONE HYDROCHLORIDE 1 MG/ML
0.2 INJECTION, SOLUTION INTRAMUSCULAR; INTRAVENOUS; SUBCUTANEOUS EVERY 5 MIN PRN
Status: DISCONTINUED | OUTPATIENT
Start: 2020-09-19 | End: 2020-09-19 | Stop reason: HOSPADM

## 2020-09-19 RX ORDER — MORPHINE SULFATE 4 MG/ML
4 INJECTION, SOLUTION INTRAMUSCULAR; INTRAVENOUS EVERY 10 MIN PRN
Status: DISCONTINUED | OUTPATIENT
Start: 2020-09-19 | End: 2020-09-19

## 2020-09-19 RX ORDER — HYDROCODONE BITARTRATE AND ACETAMINOPHEN 5; 325 MG/1; MG/1
2 TABLET ORAL AS NEEDED
Status: DISCONTINUED | OUTPATIENT
Start: 2020-09-19 | End: 2020-09-19 | Stop reason: HOSPADM

## 2020-09-19 RX ORDER — HYDROMORPHONE HYDROCHLORIDE 1 MG/ML
0.6 INJECTION, SOLUTION INTRAMUSCULAR; INTRAVENOUS; SUBCUTANEOUS EVERY 5 MIN PRN
Status: DISCONTINUED | OUTPATIENT
Start: 2020-09-19 | End: 2020-09-19

## 2020-09-19 RX ORDER — SODIUM CHLORIDE, SODIUM LACTATE, POTASSIUM CHLORIDE, CALCIUM CHLORIDE 600; 310; 30; 20 MG/100ML; MG/100ML; MG/100ML; MG/100ML
INJECTION, SOLUTION INTRAVENOUS CONTINUOUS
Status: DISCONTINUED | OUTPATIENT
Start: 2020-09-19 | End: 2020-09-19

## 2020-09-19 RX ORDER — HYDROMORPHONE HYDROCHLORIDE 1 MG/ML
0.6 INJECTION, SOLUTION INTRAMUSCULAR; INTRAVENOUS; SUBCUTANEOUS EVERY 5 MIN PRN
Status: DISCONTINUED | OUTPATIENT
Start: 2020-09-19 | End: 2020-09-19 | Stop reason: HOSPADM

## 2020-09-19 RX ORDER — PROCHLORPERAZINE EDISYLATE 5 MG/ML
5 INJECTION INTRAMUSCULAR; INTRAVENOUS ONCE AS NEEDED
Status: DISCONTINUED | OUTPATIENT
Start: 2020-09-19 | End: 2020-09-19 | Stop reason: HOSPADM

## 2020-09-19 RX ORDER — HYDROCODONE BITARTRATE AND ACETAMINOPHEN 5; 325 MG/1; MG/1
2 TABLET ORAL AS NEEDED
Status: DISCONTINUED | OUTPATIENT
Start: 2020-09-19 | End: 2020-09-19

## 2020-09-19 RX ORDER — HYDROMORPHONE HYDROCHLORIDE 1 MG/ML
0.2 INJECTION, SOLUTION INTRAMUSCULAR; INTRAVENOUS; SUBCUTANEOUS EVERY 5 MIN PRN
Status: DISCONTINUED | OUTPATIENT
Start: 2020-09-19 | End: 2020-09-19

## 2020-09-19 RX ORDER — HYDROMORPHONE HYDROCHLORIDE 1 MG/ML
0.4 INJECTION, SOLUTION INTRAMUSCULAR; INTRAVENOUS; SUBCUTANEOUS EVERY 5 MIN PRN
Status: DISCONTINUED | OUTPATIENT
Start: 2020-09-19 | End: 2020-09-19

## 2020-09-19 RX ORDER — HYDROCODONE BITARTRATE AND ACETAMINOPHEN 5; 325 MG/1; MG/1
1 TABLET ORAL AS NEEDED
Status: DISCONTINUED | OUTPATIENT
Start: 2020-09-19 | End: 2020-09-19

## 2020-09-19 RX ORDER — ONDANSETRON 2 MG/ML
4 INJECTION INTRAMUSCULAR; INTRAVENOUS ONCE AS NEEDED
Status: DISCONTINUED | OUTPATIENT
Start: 2020-09-19 | End: 2020-09-19

## 2020-09-19 RX ORDER — HALOPERIDOL 5 MG/ML
0.25 INJECTION INTRAMUSCULAR ONCE AS NEEDED
Status: DISCONTINUED | OUTPATIENT
Start: 2020-09-19 | End: 2020-09-19 | Stop reason: HOSPADM

## 2020-09-19 RX ORDER — MORPHINE SULFATE 2 MG/ML
2 INJECTION, SOLUTION INTRAMUSCULAR; INTRAVENOUS EVERY 10 MIN PRN
Status: DISCONTINUED | OUTPATIENT
Start: 2020-09-19 | End: 2020-09-19

## 2020-09-19 RX ORDER — MORPHINE SULFATE 2 MG/ML
2 INJECTION, SOLUTION INTRAMUSCULAR; INTRAVENOUS EVERY 10 MIN PRN
Status: DISCONTINUED | OUTPATIENT
Start: 2020-09-19 | End: 2020-09-19 | Stop reason: HOSPADM

## 2020-09-19 RX ORDER — HYDROMORPHONE HYDROCHLORIDE 1 MG/ML
0.4 INJECTION, SOLUTION INTRAMUSCULAR; INTRAVENOUS; SUBCUTANEOUS EVERY 5 MIN PRN
Status: DISCONTINUED | OUTPATIENT
Start: 2020-09-19 | End: 2020-09-19 | Stop reason: HOSPADM

## 2020-09-19 RX ORDER — ONDANSETRON 2 MG/ML
4 INJECTION INTRAMUSCULAR; INTRAVENOUS ONCE AS NEEDED
Status: DISCONTINUED | OUTPATIENT
Start: 2020-09-19 | End: 2020-09-19 | Stop reason: HOSPADM

## 2020-09-19 RX ADMIN — EPHEDRINE SULFATE 10 MG: 50 INJECTION, SOLUTION INTRAVENOUS at 11:02:00

## 2020-09-19 NOTE — PLAN OF CARE
Problem: Patient Centered Care  Goal: Patient preferences are identified and integrated in the patient's plan of care  Description: Interventions:  - What would you like us to know as we care for you?  Back pain started about 2 weeks ago  - Provide timely non-pharmacological measures as appropriate and evaluate response  - Consider cultural and social influences on pain and pain management  - Manage/alleviate anxiety  - Utilize distraction and/or relaxation techniques  - Monitor for opioid side effects  - N

## 2020-09-19 NOTE — CM/SW NOTE
[facility]ME called to f/u on wheelchair order. W/c documentation in Dr. Fabienne Berkowitz progress note - SW sent updated note to HME. Pt currently at MRI, RN will check pt's chart if E order form has been signed. Discharge uncertain at this time. SW to follow.      [place]

## 2020-09-19 NOTE — PROGRESS NOTES
[facility] Hospitalist Team  Progress Note    Lm Bias Patient Status:  Observation    1965 MRN [id number]   Location [facility] 5SW/SE Attending Garnet Moritz, MD   Hosp Day # 3 PCP Aissatou Rios MD     CC: Follow Up  PCP: Aissatou Rios MD plan as outpt   -appreciate cards     Hyponatremia  -likely due to IV IG     FEN  -lytes in am  -diet-general      Prophy  -SCD  -lovenox      Dispo  -pending clinical course  PCP: MD Melanie Mariscal  Group Hospitalist T 09/16/20  2246 09/17/20  0457   TROP <0.045 <0.045 <0.045           MEDICATIONS        •  gabapentin (NEURONTIN) cap 300 mg, 300 mg, Oral, Nightly    •  traMADol HCl (ULTRAM) tab 50 mg, 50 mg, Oral, Q6H PRN    •  ondansetron HCl (ZOFRAN) injection 4 mg, 4 L4-L5. Severe narrowing of the lateral and subarticular recesses throughout the entire lumbar spine. Red marrow reconversion which can be seen with anemia, obesity or prior smoking history.       Dictated by (CST): Colby Rashid MD on 9/19/2020 at 1:18 PM

## 2020-09-19 NOTE — PROGRESS NOTES
Spine Service Progress Note    24hrs/Events: Neurology consult, IVIG started for concern for Guillain-[place]    Syndrome. LP Attempted and aborted. Subjective: Patient states improvement with injection has not been durable and back pain return.  He is

## 2020-09-19 NOTE — PLAN OF CARE
Problem: Patient Centered Care  Goal: Patient preferences are identified and integrated in the patient's plan of care  Description  Interventions:  - What would you like us to know as we care for you?  Back pain started about 2 weeks ago  - Provide timely frequently for physical needs  - Identify cognitive and physical deficits and behaviors that affect risk of falls.   - [place] fall precautions as indicated by assessment.  - Educate pt/family on patient safety including physical limitations  - Instruct p

## 2020-09-19 NOTE — ANESTHESIA PREPROCEDURE EVALUATION
Anesthesia PreOp Note    HPI:     Hillary Mahajan is a 48year old male who presents for preoperative consultation requested by: * No surgeons listed *    Date of Surgery: 9/19/2020    * No procedures listed *  Indication: * No pre-op diagnosis entered *    R Iliana Vang, NP, 30 g at 09/18/20 1136    •  pregabalin (LYRICA) cap 50 mg, 50 mg, Oral, TID, Lewis Greene, DO, 50 mg at 09/18/20 2016    •  lidocaine-menthol 4-1 % 1 patch, 1 patch, Transdermal, Daily, Sean Angeles MD, 1 patch at 09/18/20 7173 and now resumed with smoking 2-3 cigars daily    Substance and Sexual Activity      Alcohol use:  Yes        Alcohol/week: 12.0 standard drinks        Types: 6 Cans of beer, 6 Shots of liquor per week      Drug use: No        Comment: history of cannabis us 11\") and weight is 89.5 kg (197 lb 4.8 oz). His oral temperature is 97.9 °F (36.6 °C). His blood pressure is 120/91 (abnormal) and his pulse is 87.  His respiration is 16 and oxygen saturation is 97%.    09/18/20 2014 09/19/20  0333 09/19/20  0434 09/19/2

## 2020-09-19 NOTE — PHYSICAL THERAPY NOTE
PHYSICAL THERAPY TREATMENT NOTE - INPATIENT     Room Number: 534/966-F       Presenting Problem: admitted due to c/o intractable LBP, bilateral leg numbness and falls;  found to have acute lumbar radiculopathy    Problem List  Principal Problem:    Intract FORM - BASIC MOBILITY  How much difficulty does the patient currently have. ..  -   Turning over in bed (including adjusting bedclothes, sheets and blankets)?: A Little   -   Sitting down on and standing up from a chair with arms (e.g., wheelchair, bedside home):  Up/down 1 stoop with use of R. W and CG. Goal #4   Current Status 1 step with one rail and HHA and min A    Goal #5 Patient to demonstrate independence with home activity/exercise instructions provided to patient in preparation for discharge.    Go

## 2020-09-19 NOTE — PROGRESS NOTES
Wife at bedside. Patient relates a 2 weeks ago when he suddenly turned he developed significant low–mid lumbar spine pain.   Couple days after that after lifting a 20 pound box he developed severe mid lumbar spine pain caused him to drop to the floor, and

## 2020-09-19 NOTE — ANESTHESIA PROCEDURE NOTES
Airway  Date/Time: 9/19/2020 10:41 AM  Urgency: elective    Airway not difficult    General Information and Staff    Patient location during procedure: imaging  Anesthesiologist: Prabha Sena MD  Performed: anesthesiologist     Indications and Delia

## 2020-09-19 NOTE — ANESTHESIA POSTPROCEDURE EVALUATION
Patient: Kayden Landa    Procedure Summary     Date: 09/19/20 Room / Location: [facility] AND [facility] MRI    Anesthesia Start: 5977 Anesthesia Stop:     Procedure: MRI SPINE THORACIC (W+WO) (CPT=72157) Diagnosis: (lumbar radiculapathy, ? GBS.  needs Sedation wti

## 2020-09-20 LAB
ANION GAP SERPL CALC-SCNC: 0 MMOL/L (ref 0–18)
APTT PPP: 30.7 SECONDS (ref 23.2–35.3)
BASOPHILS # BLD AUTO: 0.06 X10(3) UL (ref 0–0.2)
BASOPHILS NFR BLD AUTO: 1.1 %
BUN BLD-MCNC: 22 MG/DL (ref 7–18)
BUN/CREAT SERPL: 16.3 (ref 10–20)
CALCIUM BLD-MCNC: 8.9 MG/DL (ref 8.5–10.1)
CHLORIDE SERPL-SCNC: 102 MMOL/L (ref 98–112)
CO2 SERPL-SCNC: 31 MMOL/L (ref 21–32)
CREAT BLD-MCNC: 1.35 MG/DL
DEPRECATED RDW RBC AUTO: 38.2 FL (ref 35.1–46.3)
EOSINOPHIL # BLD AUTO: 0.01 X10(3) UL (ref 0–0.7)
EOSINOPHIL NFR BLD AUTO: 0.2 %
ERYTHROCYTE [DISTWIDTH] IN BLOOD BY AUTOMATED COUNT: 13.6 % (ref 11–15)
GLUCOSE BLD-MCNC: 98 MG/DL (ref 70–99)
HAV IGM SER QL: 2.3 MG/DL (ref 1.6–2.6)
HCT VFR BLD AUTO: 39.9 %
HGB BLD-MCNC: 14.6 G/DL
IMM GRANULOCYTES # BLD AUTO: 0.01 X10(3) UL (ref 0–1)
IMM GRANULOCYTES NFR BLD: 0.2 %
INR BLD: 1.03 (ref 0.9–1.2)
LYMPHOCYTES # BLD AUTO: 1.35 X10(3) UL (ref 1–4)
LYMPHOCYTES NFR BLD AUTO: 24.3 %
MCH RBC QN AUTO: 29.1 PG (ref 26–34)
MCHC RBC AUTO-ENTMCNC: 36.6 G/DL (ref 31–37)
MCV RBC AUTO: 79.5 FL
MONOCYTES # BLD AUTO: 1.44 X10(3) UL (ref 0.1–1)
MONOCYTES NFR BLD AUTO: 25.9 %
MYELOPEROX ANTIBODIES, IGG: 3 AU/ML
NEUTROPHILS # BLD AUTO: 2.68 X10 (3) UL (ref 1.5–7.7)
NEUTROPHILS # BLD AUTO: 2.68 X10(3) UL (ref 1.5–7.7)
NEUTROPHILS NFR BLD AUTO: 48.3 %
OSMOLALITY SERPL CALC.SUM OF ELEC: 279 MOSM/KG (ref 275–295)
PLATELET # BLD AUTO: 154 10(3)UL (ref 150–450)
POTASSIUM SERPL-SCNC: 4.3 MMOL/L (ref 3.5–5.1)
PROTHROMBIN TIME: 13.3 SECONDS (ref 11.8–14.5)
RBC # BLD AUTO: 5.02 X10(6)UL
SERINE PROTEASE3, IGG: 9 AU/ML
SODIUM SERPL-SCNC: 133 MMOL/L (ref 136–145)
WBC # BLD AUTO: 5.6 X10(3) UL (ref 4–11)

## 2020-09-20 PROCEDURE — 99233 SBSQ HOSP IP/OBS HIGH 50: CPT | Performed by: OTHER

## 2020-09-20 RX ORDER — SODIUM CHLORIDE, SODIUM LACTATE, POTASSIUM CHLORIDE, CALCIUM CHLORIDE 600; 310; 30; 20 MG/100ML; MG/100ML; MG/100ML; MG/100ML
INJECTION, SOLUTION INTRAVENOUS CONTINUOUS
Status: DISCONTINUED | OUTPATIENT
Start: 2020-09-21 | End: 2020-09-21

## 2020-09-20 RX ORDER — METOPROLOL SUCCINATE 25 MG/1
25 TABLET, EXTENDED RELEASE ORAL
Status: DISCONTINUED | OUTPATIENT
Start: 2020-09-20 | End: 2020-09-21 | Stop reason: HOSPADM

## 2020-09-20 NOTE — PLAN OF CARE
Patient states less numbness and pain in left leg than earlier. Lovenox on hold for surgery on Monday.      Problem: Patient Centered Care  Goal: Patient preferences are identified and integrated in the patient's plan of care  Description: Interventions:  - Problem: SAFETY ADULT - FALL  Goal: Free from fall injury  Description: INTERVENTIONS:  - Assess pt frequently for physical needs  - Identify cognitive and physical deficits and behaviors that affect risk of falls.   - [place] fall precautions as indica

## 2020-09-20 NOTE — PROGRESS NOTES
[facility] Hospitalist Team  Progress Note    Brie Carnation Patient Status:  Observation    1965 MRN [id number]   Location [facility] 5SW/SE Attending Olga Cabezas MD   Hosp Day # 4 PCP Christy Lorenzo MD     CC: Follow Up  PCP: Christy Lorenzo MD lying flat for lexiscan nuclear stress, will plan as outpt   -cards started   -appreciate cards     Hyponatremia  -likely due to IV IG     FEN  -lytes in am  -diet-general      Prophy  -SCD  -lovenox      Dispo  -pending clinical course  PCP: Aby Lyon 09/16/20 2050   ALT 76*   AST 65*   ALB 3.7       No results for input(s): PGLU in the last 168 hours.     Recent Labs   Lab 09/16/20 2050 09/16/20 2246 09/17/20  0457   TROP <0.045 <0.045 <0.045           MEDICATIONS        •  Metoprolol Succinate ER (T fragment resulting in critical stenosis of the canal and no normal intrathecal CSF signal seen. Severe narrowing of the canal at L3-L4 and L4-L5. Severe narrowing of the lateral and subarticular recesses throughout the entire lumbar spine.   Red marrow re

## 2020-09-20 NOTE — PROGRESS NOTES
Assessment and Plan:     1. Chest pain, atypical  - no evidence of ACS  - normal d-dimer  - likely M-S   - smoker  - Echo-normal  - unable to do Kamala  2.  GBS (Guillain [place] Syndrome)           PLAN:  - apparent spine OR in am  -pt still unable to lay fl the thoracic spine, largest at T4-T5. The central disc protrusion at T4-T5 abuts and flattens the ventral cord resulting in mild narrowing of the canal.  No cord edema or myelomalacia. No high-grade canal or foraminal narrowing.     Dictated by (CST): Gar

## 2020-09-20 NOTE — PLAN OF CARE
Problem: Patient Centered Care  Goal: Patient preferences are identified and integrated in the patient's plan of care  Description: Interventions:  - What would you like us to know as we care for you?  Back pain started about 2 weeks ago  - Provide timely frequently for physical needs  - Identify cognitive and physical deficits and behaviors that affect risk of falls.   - [place] fall precautions as indicated by assessment.  - Educate pt/family on patient safety including physical limitations  - Instruct p

## 2020-09-20 NOTE — PLAN OF CARE
Orders to transfer patient to ortho/remote tele for surgery tomorrow. Patient updated on plan of care. katya jolley called and given verbal report. Belongings packed and sent with patient.

## 2020-09-21 ENCOUNTER — ANESTHESIA (OUTPATIENT)
Dept: SURGERY | Facility: HOSPITAL | Age: 55
DRG: 519 | End: 2020-09-21
Payer: MEDICAID

## 2020-09-21 ENCOUNTER — APPOINTMENT (OUTPATIENT)
Dept: GENERAL RADIOLOGY | Facility: HOSPITAL | Age: 55
DRG: 519 | End: 2020-09-21
Attending: ORTHOPAEDIC SURGERY
Payer: MEDICAID

## 2020-09-21 ENCOUNTER — ANESTHESIA EVENT (OUTPATIENT)
Dept: SURGERY | Facility: HOSPITAL | Age: 55
DRG: 519 | End: 2020-09-21
Payer: MEDICAID

## 2020-09-21 LAB — MRSA DNA SPEC QL NAA+PROBE: NEGATIVE

## 2020-09-21 PROCEDURE — 01NB0ZZ RELEASE LUMBAR NERVE, OPEN APPROACH: ICD-10-PCS | Performed by: ORTHOPAEDIC SURGERY

## 2020-09-21 PROCEDURE — 72020 X-RAY EXAM OF SPINE 1 VIEW: CPT | Performed by: ORTHOPAEDIC SURGERY

## 2020-09-21 PROCEDURE — 0SB20ZZ EXCISION OF LUMBAR VERTEBRAL DISC, OPEN APPROACH: ICD-10-PCS | Performed by: ORTHOPAEDIC SURGERY

## 2020-09-21 RX ORDER — ONDANSETRON 2 MG/ML
4 INJECTION INTRAMUSCULAR; INTRAVENOUS ONCE AS NEEDED
Status: DISCONTINUED | OUTPATIENT
Start: 2020-09-21 | End: 2020-09-21 | Stop reason: HOSPADM

## 2020-09-21 RX ORDER — SENNOSIDES 8.6 MG
17.2 TABLET ORAL NIGHTLY
Status: DISCONTINUED | OUTPATIENT
Start: 2020-09-21 | End: 2020-09-22

## 2020-09-21 RX ORDER — PROCHLORPERAZINE EDISYLATE 5 MG/ML
10 INJECTION INTRAMUSCULAR; INTRAVENOUS EVERY 6 HOURS PRN
Status: DISCONTINUED | OUTPATIENT
Start: 2020-09-21 | End: 2020-09-22

## 2020-09-21 RX ORDER — POLYETHYLENE GLYCOL 3350 17 G/17G
17 POWDER, FOR SOLUTION ORAL DAILY PRN
Status: DISCONTINUED | OUTPATIENT
Start: 2020-09-21 | End: 2020-09-22

## 2020-09-21 RX ORDER — ACETAMINOPHEN 325 MG/1
650 TABLET ORAL EVERY 4 HOURS PRN
Status: DISCONTINUED | OUTPATIENT
Start: 2020-09-21 | End: 2020-09-22

## 2020-09-21 RX ORDER — SODIUM PHOSPHATE, DIBASIC AND SODIUM PHOSPHATE, MONOBASIC 7; 19 G/133ML; G/133ML
1 ENEMA RECTAL ONCE AS NEEDED
Status: DISCONTINUED | OUTPATIENT
Start: 2020-09-21 | End: 2020-09-22

## 2020-09-21 RX ORDER — HYDROMORPHONE HYDROCHLORIDE 1 MG/ML
0.2 INJECTION, SOLUTION INTRAMUSCULAR; INTRAVENOUS; SUBCUTANEOUS EVERY 5 MIN PRN
Status: DISCONTINUED | OUTPATIENT
Start: 2020-09-21 | End: 2020-09-21 | Stop reason: HOSPADM

## 2020-09-21 RX ORDER — MIDAZOLAM HYDROCHLORIDE 1 MG/ML
INJECTION INTRAMUSCULAR; INTRAVENOUS AS NEEDED
Status: DISCONTINUED | OUTPATIENT
Start: 2020-09-21 | End: 2020-09-21 | Stop reason: SURG

## 2020-09-21 RX ORDER — CELECOXIB 200 MG/1
200 CAPSULE ORAL DAILY
Status: DISCONTINUED | OUTPATIENT
Start: 2020-09-22 | End: 2020-09-22

## 2020-09-21 RX ORDER — HYDROMORPHONE HYDROCHLORIDE 1 MG/ML
0.2 INJECTION, SOLUTION INTRAMUSCULAR; INTRAVENOUS; SUBCUTANEOUS EVERY 2 HOUR PRN
Status: DISCONTINUED | OUTPATIENT
Start: 2020-09-21 | End: 2020-09-22

## 2020-09-21 RX ORDER — ONDANSETRON 2 MG/ML
INJECTION INTRAMUSCULAR; INTRAVENOUS AS NEEDED
Status: DISCONTINUED | OUTPATIENT
Start: 2020-09-21 | End: 2020-09-21 | Stop reason: SURG

## 2020-09-21 RX ORDER — DEXAMETHASONE SODIUM PHOSPHATE 10 MG/ML
10 INJECTION, SOLUTION INTRAMUSCULAR; INTRAVENOUS EVERY 8 HOURS
Status: COMPLETED | OUTPATIENT
Start: 2020-09-21 | End: 2020-09-22

## 2020-09-21 RX ORDER — PHENYLEPHRINE HCL 10 MG/ML
VIAL (ML) INJECTION AS NEEDED
Status: DISCONTINUED | OUTPATIENT
Start: 2020-09-21 | End: 2020-09-21 | Stop reason: SURG

## 2020-09-21 RX ORDER — PROCHLORPERAZINE EDISYLATE 5 MG/ML
5 INJECTION INTRAMUSCULAR; INTRAVENOUS ONCE AS NEEDED
Status: DISCONTINUED | OUTPATIENT
Start: 2020-09-21 | End: 2020-09-21 | Stop reason: HOSPADM

## 2020-09-21 RX ORDER — METHOCARBAMOL 750 MG/1
750 TABLET, FILM COATED ORAL 2 TIMES DAILY PRN
Status: DISCONTINUED | OUTPATIENT
Start: 2020-09-21 | End: 2020-09-22

## 2020-09-21 RX ORDER — NEOSTIGMINE METHYLSULFATE 1 MG/ML
INJECTION INTRAVENOUS AS NEEDED
Status: DISCONTINUED | OUTPATIENT
Start: 2020-09-21 | End: 2020-09-21 | Stop reason: SURG

## 2020-09-21 RX ORDER — HYDROCODONE BITARTRATE AND ACETAMINOPHEN 10; 325 MG/1; MG/1
1 TABLET ORAL EVERY 4 HOURS PRN
Status: DISCONTINUED | OUTPATIENT
Start: 2020-09-21 | End: 2020-09-22

## 2020-09-21 RX ORDER — MORPHINE SULFATE 4 MG/ML
2 INJECTION, SOLUTION INTRAMUSCULAR; INTRAVENOUS EVERY 10 MIN PRN
Status: DISCONTINUED | OUTPATIENT
Start: 2020-09-21 | End: 2020-09-21 | Stop reason: HOSPADM

## 2020-09-21 RX ORDER — ROCURONIUM BROMIDE 10 MG/ML
INJECTION, SOLUTION INTRAVENOUS AS NEEDED
Status: DISCONTINUED | OUTPATIENT
Start: 2020-09-21 | End: 2020-09-21 | Stop reason: SURG

## 2020-09-21 RX ORDER — DIPHENHYDRAMINE HCL 25 MG
25 CAPSULE ORAL EVERY 4 HOURS PRN
Status: DISCONTINUED | OUTPATIENT
Start: 2020-09-21 | End: 2020-09-22

## 2020-09-21 RX ORDER — HYDROMORPHONE HYDROCHLORIDE 1 MG/ML
0.6 INJECTION, SOLUTION INTRAMUSCULAR; INTRAVENOUS; SUBCUTANEOUS EVERY 5 MIN PRN
Status: DISCONTINUED | OUTPATIENT
Start: 2020-09-21 | End: 2020-09-21 | Stop reason: HOSPADM

## 2020-09-21 RX ORDER — SODIUM CHLORIDE, SODIUM LACTATE, POTASSIUM CHLORIDE, CALCIUM CHLORIDE 600; 310; 30; 20 MG/100ML; MG/100ML; MG/100ML; MG/100ML
INJECTION, SOLUTION INTRAVENOUS CONTINUOUS
Status: DISCONTINUED | OUTPATIENT
Start: 2020-09-21 | End: 2020-09-21 | Stop reason: HOSPADM

## 2020-09-21 RX ORDER — NALOXONE HYDROCHLORIDE 0.4 MG/ML
80 INJECTION, SOLUTION INTRAMUSCULAR; INTRAVENOUS; SUBCUTANEOUS AS NEEDED
Status: DISCONTINUED | OUTPATIENT
Start: 2020-09-21 | End: 2020-09-21 | Stop reason: HOSPADM

## 2020-09-21 RX ORDER — DIPHENHYDRAMINE HYDROCHLORIDE 50 MG/ML
25 INJECTION INTRAMUSCULAR; INTRAVENOUS EVERY 4 HOURS PRN
Status: DISCONTINUED | OUTPATIENT
Start: 2020-09-21 | End: 2020-09-22

## 2020-09-21 RX ORDER — HYDROCODONE BITARTRATE AND ACETAMINOPHEN 5; 325 MG/1; MG/1
2 TABLET ORAL AS NEEDED
Status: DISCONTINUED | OUTPATIENT
Start: 2020-09-21 | End: 2020-09-21 | Stop reason: HOSPADM

## 2020-09-21 RX ORDER — HYDROMORPHONE HYDROCHLORIDE 1 MG/ML
0.4 INJECTION, SOLUTION INTRAMUSCULAR; INTRAVENOUS; SUBCUTANEOUS EVERY 2 HOUR PRN
Status: DISCONTINUED | OUTPATIENT
Start: 2020-09-21 | End: 2020-09-22

## 2020-09-21 RX ORDER — METOCLOPRAMIDE HYDROCHLORIDE 5 MG/ML
10 INJECTION INTRAMUSCULAR; INTRAVENOUS EVERY 6 HOURS PRN
Status: DISCONTINUED | OUTPATIENT
Start: 2020-09-21 | End: 2020-09-22

## 2020-09-21 RX ORDER — LIDOCAINE HYDROCHLORIDE 10 MG/ML
INJECTION, SOLUTION EPIDURAL; INFILTRATION; INTRACAUDAL; PERINEURAL AS NEEDED
Status: DISCONTINUED | OUTPATIENT
Start: 2020-09-21 | End: 2020-09-21 | Stop reason: SURG

## 2020-09-21 RX ORDER — PREGABALIN 50 MG/1
50 CAPSULE ORAL 2 TIMES DAILY
Status: DISCONTINUED | OUTPATIENT
Start: 2020-09-21 | End: 2020-09-22

## 2020-09-21 RX ORDER — CEFAZOLIN SODIUM/WATER 2 G/20 ML
2 SYRINGE (ML) INTRAVENOUS EVERY 8 HOURS
Status: COMPLETED | OUTPATIENT
Start: 2020-09-22 | End: 2020-09-22

## 2020-09-21 RX ORDER — ONDANSETRON 2 MG/ML
4 INJECTION INTRAMUSCULAR; INTRAVENOUS EVERY 4 HOURS PRN
Status: DISCONTINUED | OUTPATIENT
Start: 2020-09-21 | End: 2020-09-22

## 2020-09-21 RX ORDER — MORPHINE SULFATE 10 MG/ML
6 INJECTION, SOLUTION INTRAMUSCULAR; INTRAVENOUS EVERY 10 MIN PRN
Status: DISCONTINUED | OUTPATIENT
Start: 2020-09-21 | End: 2020-09-21 | Stop reason: HOSPADM

## 2020-09-21 RX ORDER — HYDROMORPHONE HYDROCHLORIDE 1 MG/ML
0.8 INJECTION, SOLUTION INTRAMUSCULAR; INTRAVENOUS; SUBCUTANEOUS EVERY 2 HOUR PRN
Status: DISCONTINUED | OUTPATIENT
Start: 2020-09-21 | End: 2020-09-22

## 2020-09-21 RX ORDER — DOCUSATE SODIUM 100 MG/1
100 CAPSULE, LIQUID FILLED ORAL 2 TIMES DAILY
Status: DISCONTINUED | OUTPATIENT
Start: 2020-09-21 | End: 2020-09-22

## 2020-09-21 RX ORDER — HYDROCODONE BITARTRATE AND ACETAMINOPHEN 10; 325 MG/1; MG/1
2 TABLET ORAL EVERY 4 HOURS PRN
Status: DISCONTINUED | OUTPATIENT
Start: 2020-09-21 | End: 2020-09-22

## 2020-09-21 RX ORDER — BISACODYL 10 MG
10 SUPPOSITORY, RECTAL RECTAL
Status: DISCONTINUED | OUTPATIENT
Start: 2020-09-21 | End: 2020-09-22

## 2020-09-21 RX ORDER — HYDROMORPHONE HYDROCHLORIDE 1 MG/ML
0.4 INJECTION, SOLUTION INTRAMUSCULAR; INTRAVENOUS; SUBCUTANEOUS EVERY 5 MIN PRN
Status: DISCONTINUED | OUTPATIENT
Start: 2020-09-21 | End: 2020-09-21 | Stop reason: HOSPADM

## 2020-09-21 RX ORDER — HYDROCODONE BITARTRATE AND ACETAMINOPHEN 5; 325 MG/1; MG/1
1 TABLET ORAL AS NEEDED
Status: DISCONTINUED | OUTPATIENT
Start: 2020-09-21 | End: 2020-09-21 | Stop reason: HOSPADM

## 2020-09-21 RX ORDER — MORPHINE SULFATE 4 MG/ML
4 INJECTION, SOLUTION INTRAMUSCULAR; INTRAVENOUS EVERY 10 MIN PRN
Status: DISCONTINUED | OUTPATIENT
Start: 2020-09-21 | End: 2020-09-21 | Stop reason: HOSPADM

## 2020-09-21 RX ORDER — GLYCOPYRROLATE 0.2 MG/ML
INJECTION, SOLUTION INTRAMUSCULAR; INTRAVENOUS AS NEEDED
Status: DISCONTINUED | OUTPATIENT
Start: 2020-09-21 | End: 2020-09-21 | Stop reason: SURG

## 2020-09-21 RX ORDER — CEFAZOLIN SODIUM 1 G/3ML
INJECTION, POWDER, FOR SOLUTION INTRAMUSCULAR; INTRAVENOUS AS NEEDED
Status: DISCONTINUED | OUTPATIENT
Start: 2020-09-21 | End: 2020-09-21 | Stop reason: SURG

## 2020-09-21 RX ADMIN — SODIUM CHLORIDE, SODIUM LACTATE, POTASSIUM CHLORIDE, CALCIUM CHLORIDE: 600; 310; 30; 20 INJECTION, SOLUTION INTRAVENOUS at 19:05:00

## 2020-09-21 RX ADMIN — LIDOCAINE HYDROCHLORIDE 50 MG: 10 INJECTION, SOLUTION EPIDURAL; INFILTRATION; INTRACAUDAL; PERINEURAL at 16:43:00

## 2020-09-21 RX ADMIN — ROCURONIUM BROMIDE 5 MG: 10 INJECTION, SOLUTION INTRAVENOUS at 16:43:00

## 2020-09-21 RX ADMIN — PHENYLEPHRINE HCL 100 MCG: 10 MG/ML VIAL (ML) INJECTION at 16:52:00

## 2020-09-21 RX ADMIN — ONDANSETRON 4 MG: 2 INJECTION INTRAMUSCULAR; INTRAVENOUS at 18:57:00

## 2020-09-21 RX ADMIN — GLYCOPYRROLATE 1 MG: 0.2 INJECTION, SOLUTION INTRAMUSCULAR; INTRAVENOUS at 18:57:00

## 2020-09-21 RX ADMIN — PHENYLEPHRINE HCL 100 MCG: 10 MG/ML VIAL (ML) INJECTION at 16:49:00

## 2020-09-21 RX ADMIN — CEFAZOLIN SODIUM 2 G: 1 INJECTION, POWDER, FOR SOLUTION INTRAMUSCULAR; INTRAVENOUS at 17:05:00

## 2020-09-21 RX ADMIN — ROCURONIUM BROMIDE 40 MG: 10 INJECTION, SOLUTION INTRAVENOUS at 16:55:00

## 2020-09-21 RX ADMIN — MIDAZOLAM HYDROCHLORIDE 2 MG: 1 INJECTION INTRAMUSCULAR; INTRAVENOUS at 16:40:00

## 2020-09-21 RX ADMIN — PHENYLEPHRINE HCL 100 MCG: 10 MG/ML VIAL (ML) INJECTION at 16:57:00

## 2020-09-21 RX ADMIN — GLYCOPYRROLATE 0.2 MG: 0.2 INJECTION, SOLUTION INTRAMUSCULAR; INTRAVENOUS at 16:41:00

## 2020-09-21 RX ADMIN — NEOSTIGMINE METHYLSULFATE 5 MG: 1 INJECTION INTRAVENOUS at 18:57:00

## 2020-09-21 RX ADMIN — PHENYLEPHRINE HCL 50 MCG: 10 MG/ML VIAL (ML) INJECTION at 17:50:00

## 2020-09-21 NOTE — PROGRESS NOTES
[facility]    Cardiology Progress Note    Kusum Aleman Patient Status:  Inpatient    1965 MRN [id number]   Location [facility] 4W/SW/SE Attending Anayeli Sanders MD   Hosp Day # 5 PCP Raúl Menon MD       Impression/Plan:  42 Normal excursions and effort. Abdomen: Soft, non-tender. No organosplenomegally, mass or rebound, BS-present. Extremities: Without clubbing, cyanosis or edema. Peripheral pulses are 2+. Neurologic: Alert and oriented, normal affect  Skin: Warm and dry. HYDROcodone-acetaminophen (NORCO) 5-325 MG per tab 2 tablet, 2 tablet, Oral, Q4H PRN    •  cyclobenzaprine (FLEXERIL) tab 10 mg, 10 mg, Oral, TID PRN    •  HYDROcodone-acetaminophen (NORCO)  MG per tab 1 tablet, 1 tablet, Oral, Q4H PRN        Jay A

## 2020-09-21 NOTE — PAYOR COMM NOTE
--------------  9/20-21 CONTINUED STAY REVIEW    Payor: Jacob Vergara #:  [id number]  Authorization Number: [id number]    9/20:    ORTHO:      Subjective: Still with Pain.  Unable to successfully and safely mobilize w stenosis and radiculopathy with progressive weakness. We discussed continued non-surgical care, including interventional mgmt vs surgical intervention.  We discussed that because of the progressive weakness, I would consider surgical intervention at an LMA  Post-op Pain Management: IV analgesics    9/21:  Impression/Plan:  48year old male presenting with:     1) Multilevel lumbar spondylostenosis with acute disc extrusion L2/L3  2) GBS  3) Atypical chest pain, resolved     - Planned for OR today for L2/ Levon Pyle RN

## 2020-09-21 NOTE — PLAN OF CARE
Plan for laminectomy and discectomy later this afternoon with Dr. Stef Monteiro. NPO since midnight. Norco and flexeril prn for pain control. CMS remains unchanged. Lovenox on hold for procedure.  Patient refusing SCD's but is actively moving around in the bed pain scale  - Administer analgesics based on type and severity of pain and evaluate response  - Implement non-pharmacological measures as appropriate and evaluate response  - Consider cultural and social influences on pain and pain management  - Manage/all

## 2020-09-21 NOTE — PROGRESS NOTES
[facility]    Progress Note    Mp Salazar Patient Status:  Inpatient    1965 MRN [id number]   Location [facility] 4W/SW/SE Attending Dangelo Lr MD   Hosp Day # 4 PCP Fani Hill MD       Subjective:   Mp Salazar is a nasal congestion, sinus pain or sore throat; hearing loss negative  RESPIRATORY: denies shortness of breath, wheezing or cough   CARDIOVASCULAR: denies chest pain or QUARLES; no palpitations   GI: denies nausea, vomiting, constipation, diarrhea; no heartburn fragment resulting in critical stenosis of the canal and no normal intrathecal CSF signal seen. Severe narrowing of the canal at L3-L4 and L4-L5. Severe narrowing of the lateral and subarticular recesses throughout the entire lumbar spine.   Red marrow re

## 2020-09-21 NOTE — ANESTHESIA PROCEDURE NOTES
Airway  Date/Time: 9/21/2020 4:47 PM  Urgency: Elective    Airway not difficult    General Information and Staff    Patient location during procedure: OR  Anesthesiologist: Grey Becerril MD  Resident/CRNA: Valdo Turner CRNA  Performed: [place]

## 2020-09-21 NOTE — PROGRESS NOTES
[facility] Hospitalist Team  Progress Note    Christiana Preciado Patient Status:  Inpatient    1965 MRN [id number]   Location [facility] 4W/SW/SE Attending Cheyanne Rios MD   Hosp Day # 5 PCP Jovan Navarro MD     CC: Follow Up  PCP: Jovan Navarro MD -lopressor added  -appreciate cards      Hyponatremia  -likely due to IV IG     FEN  -lytes in am  -diet-NPO     Prophy  -SCD  -lovenox on hold for OR      Dispo  -pending clinical course  PCP: Fani Hill MD       Concerns regarding plan of care we Nare, BID    •  Metoprolol Succinate ER (Toprol XL) 24 hr tab 25 mg, 25 mg, Oral, Daily Beta Blocker    •  lactated ringers infusion, , Intravenous, Continuous    •  gabapentin (NEURONTIN) cap 300 mg, 300 mg, Oral, Nightly    •  traMADol HCl (ULTRAM) tab 5 narrowing of the lateral and subarticular recesses throughout the entire lumbar spine. Red marrow reconversion which can be seen with anemia, obesity or prior smoking history.       Dictated by (CST): Akiar Boles MD on 9/19/2020 at 1:18 PM     Finalized b

## 2020-09-21 NOTE — ANESTHESIA PREPROCEDURE EVALUATION
Anesthesia PreOp Note    HPI:     Alena Landa is a 48year old male who presents for preoperative consultation requested by: Clem Rod MD    Date of Surgery: 9/11/2020 - 9/21/2020    Procedure(s):  LUMBAR LAMINECTOMY 1 LEVEL  Indication: HNP (h infusion, , Intravenous, Continuous, Barbara Hoff MD, Last Rate: 20 mL/hr at 09/21/20 0010    •  [MAR Hold] gabapentin (NEURONTIN) cap 300 mg, 300 mg, Oral, Nightly, Herve Cardona MD, 300 mg at 09/20/20 2102    •  [MAR Hold] traMADol HCl (U Transportation needs        Medical: Not on file        Non-medical: Not on file    Tobacco Use      Smoking status: Current Every Day Smoker        Packs/day: 1.00        Years: 30.00        Pack years: 30        Types: Cigars      Smokeless tobacco: Adeline Pancho 09/20/2020    K 4.3 09/20/2020     09/20/2020    CO2 31.0 09/20/2020    BUN 22 (H) 09/20/2020    CREATSERUM 1.35 (H) 09/20/2020    GLU 98 09/20/2020    CA 8.9 09/20/2020     Lab Results   Component Value Date    INR 1.03 09/20/2020       Vital Signs:

## 2020-09-21 NOTE — PHYSICAL THERAPY NOTE
Attempted treatment pt declined therapy this am. Pt is going for surgery this afternoon and will require new orders to continue therapy. RN aware.

## 2020-09-21 NOTE — PROGRESS NOTES
Spine Service Progress Note    24hrs/Events: NPO overnight  Subjective: For the most part no changes, although now endorses worsening dyserethesia and paraesthesias. Still continent of bowel and bladder.      Objective:   /71 (BP Location: Right a

## 2020-09-21 NOTE — PROGRESS NOTES
Spine Service Progress Note    24hrs/Events: NAEO    Subjective: Still with Pain. Unable to successfully and safely mobilize without significant dependence on walker.      Objective:   /90 (BP Location: Right arm)   Pulse 79   Temp 98 °F (36.7 °C) COMPARISON: [facility], MRI SPINE THORACIC (W+WO) (OTS=14695), 9/19/2020, 10:39 AM.  [address] [place] [address], 9/14/2020, 2:07 PM.  [facility], MRI SPINE LUMBAR (CHL=25056), 9/12/2020,   6 series 5, image 10.  There is complete effacement of the dorsal and ventral CSF spaces with no normal intrathecal CSF signal visualized, resulting in critical stenosis of the canal. There is proximal bunching of the nerve roots with a redundant nerve root Assessment: Multilevel Lumbar Spondylostenosis with superimposed acute disc extrusion at L2/3 resulting in critical stenosis and radiculopathy with progressive weakness.      We discussed continued non-surgical care, including interventional mgmt vs surgica Romelia Escalante.  Katerina Rodas MD  Division of Spine Surgery  [facility], [address]

## 2020-09-22 VITALS
BODY MASS INDEX: 28.02 KG/M2 | TEMPERATURE: 99 F | OXYGEN SATURATION: 96 % | HEART RATE: 84 BPM | RESPIRATION RATE: 16 BRPM | HEIGHT: 71 IN | DIASTOLIC BLOOD PRESSURE: 65 MMHG | WEIGHT: 200.19 LBS | SYSTOLIC BLOOD PRESSURE: 133 MMHG

## 2020-09-22 RX ORDER — PREGABALIN 50 MG/1
50 CAPSULE ORAL 2 TIMES DAILY
Qty: 60 CAPSULE | Refills: 0 | Status: SHIPPED | OUTPATIENT
Start: 2020-09-22 | End: 2020-12-23

## 2020-09-22 RX ORDER — HYDROCODONE BITARTRATE AND ACETAMINOPHEN 10; 325 MG/1; MG/1
1 TABLET ORAL EVERY 4 HOURS PRN
Qty: 30 TABLET | Refills: 0 | Status: SHIPPED | OUTPATIENT
Start: 2020-09-22 | End: 2020-12-23

## 2020-09-22 RX ORDER — METHOCARBAMOL 750 MG/1
750 TABLET, FILM COATED ORAL 2 TIMES DAILY PRN
Qty: 21 TABLET | Refills: 0 | Status: SHIPPED | OUTPATIENT
Start: 2020-09-22 | End: 2020-10-02

## 2020-09-22 RX ORDER — PSEUDOEPHEDRINE HCL 30 MG
100 TABLET ORAL 2 TIMES DAILY
Qty: 60 CAPSULE | Refills: 0 | Status: SHIPPED | OUTPATIENT
Start: 2020-09-22 | End: 2020-11-19

## 2020-09-22 NOTE — PLAN OF CARE
Pt returned from PACU at 2040. POD #0-1. Vital signs within normal limits. PRN Norco and Dilaudid provided for pain. Alert and oriented x4. States having numbness to BLE although improving. Tele #71 in place, NSR. On room air. Tolerating general diet.  Void based on type and severity of pain and evaluate response  - Implement non-pharmacological measures as appropriate and evaluate response  - Consider cultural and social influences on pain and pain management  - Manage/alleviate anxiety  - Utilize distractio

## 2020-09-22 NOTE — CM/SW NOTE
SW received call from [facility] APN regarding pt's change in DC plan. Pt would like to DC home with [facility] AT [facility]. EDILBERTO sent referral to [address] [place] [address] -  accepted    SW confirmed with RN and [facility] APN who states the two [facility] paperwork were completed.  EDILBERTO called and notified Th

## 2020-09-22 NOTE — OCCUPATIONAL THERAPY NOTE
OCCUPATIONAL THERAPY RE- EVALUATION - INPATIENT     Room Number: 068/167-X  Evaluation Date: 9/13/2020  Type of Evaluation: Re-evaluation  Presenting Problem: (fall, LBP, LE numbness)    Physician Order: IP Consult to Occupational Therapy  Reason for BEBETO BOWERS [facility] activities; Patient/Family training;Equipment eval/education; Compensatory technique education;IADL training       OCCUPATIONAL THERAPY MEDICAL/SOCIAL HISTORY     Problem List  Principal Problem:    Intractable low back pain  Active Problems:    Lumbar radic Little  -   Putting on and taking off regular upper body clothing?: A Little  -   Taking care of personal grooming such as brushing teeth?: None  -   Eating meals?: None    AM-PAC Score:  Score: 20  Approx Degree of Impairment: 38.32%  Standardized Score (

## 2020-09-22 NOTE — PLAN OF CARE
Patient is alert and oriented X3. Voiding freely. Up with one assist with RW. Patient needing constant reminders for back precautions. CMS intact except to left foot numbness. Dressing is CDI. Pain managed with oral pain medications. VS WNL, on room air.  Davis Taveras anxiety  - Utilize distraction and/or relaxation techniques  - Monitor for opioid side effects  - Notify MD/LIP if interventions unsuccessful or patient reports new pain  - Anticipate increased pain with activity and pre-medicate as appropriate  Outcome: P

## 2020-09-22 NOTE — CM/SW NOTE
MD order received regarding discharge planning. Plan at this time is for the pt. To go to [facility]. Prescription for Day rehab has been previously faxed on 9/18. Will need to update [facility] when DC date is known.       Referral made

## 2020-09-22 NOTE — PROGRESS NOTES
Spine Service Progress Note    24hrs/Events: POD #1 s/p L2/3 open laminectomy, and discectomy. Has been up. + Void. Subjective: Notices improvement in his proximal leg strength. Still with dense foot drop.  Back pain improved, now endorses surgical

## 2020-09-22 NOTE — OPERATIVE REPORT
Operative Note    DOS: 09/21/20  Patient Name:  Belloeacarlos Husbands  Preoperative Diagnosis: Herniated Nucleus Pulposis L2/3, multilevel spondylosis with varying degrees of stenosis, severe acute lumbar radiculopathy with progressive weakness and poor mobi Rosalinda Hines an 48year old male who presented with a history of bilateral leg pain, numbness, and weakness, Workup included radiographs and MRI.   The patient was diagnosed with multilevel spondylosis and stenosis with an acute superimposed large disc he The patient was met in the preop holding area, we reviewed elements of the patient's history and physical examination along with the planned surgical procedure. The patient was in agreement and the surgical site was marked with indelible ink.    The patien The nerve root was identified and,  along with the thecal sac was directly visualized, it was difficult to mobilize as there was a large ventral herniation.  Using varius blunt instruments, the thecal sac was gently retracted to midline exposing the underli [facility] [facility], [address] [place] [address]

## 2020-09-22 NOTE — PHYSICAL THERAPY NOTE
PHYSICAL THERAPY RE-EVALUATION - INPATIENT     Room Number: 226/092-N       Presenting Problem: *now s/p L2-3 microdiskectomy by Dr. Hanna Raymond on 9/21(new orders received)    Problem List  Principal Problem:    Intractable low back pain  Active Problems: calculated based on documentation in the [facility] '6 clicks' Inpatient Basic Mobility Short Form. Research supports that patients with this level of impairment may benefit from home with HHPT. Pt will need a RW at d/c-RN aware and ordering.     DISCHARGE SUNSHINE STATUS  Gait Assessment   Gait Assistance: (SBA progressing to supervision)  Distance (ft): X40', X50'  Assistive Device: Rolling walker  Pattern: L Foot drag(flexed posture, slow lesley)  Stoop/Curb Assistance: Contact guard assist  Comment : up/down 1 c

## 2020-09-22 NOTE — HOME CARE LIAISON
Received referral from [address]. Met with patient at the bedside to discuss home health services and offer choice. [facility]. Brochure and contact information provided. Any questions addressed. Will follow.

## 2020-09-22 NOTE — ANESTHESIA POSTPROCEDURE EVALUATION
Patient: Chacha Otto    Procedure Summary     Date: 09/21/20 Room / Location: [facility] OR 09 / [facility] OR    Anesthesia Start: 2485 Anesthesia Stop: 1914    Procedure: LUMBAR LAMINECTOMY 1 LEVEL (N/A Back) Diagnosis:       HNP (herniated nucleus pulposus

## 2020-09-22 NOTE — PLAN OF CARE
Patient cleared for discharge per surgery, therapy, and medical. Medications, discharge instructions, and follow up appointments reviewed with patient. Rx and belongings taken with patient. Will DC with a walker and HH. Stable upon discharge.

## 2020-09-22 NOTE — DISCHARGE SUMMARY
[facility] Hospitalist Discharge Summary   Patient ID:  Socorro Pablo  [id number]  89 year old  8/31/1965    Admit date: 9/11/2020  Discharge date: 9/22/2020    Primary Care Physician: Karen Sevilla MD   Attending Physician: Enriqueta Colon MD   Consults:   Kamar Bryan radicular symptoms, MRI with some disc disease but limited study due to motion artifact, who is S/P lumbar epidural injection, issues with ongoing left leg weakness and foot drop, neurology consulted, unable to tolerate LP, pt being tx with IVIG for possib no murmur  ABD: soft, NT, ND, BS+  EXTREMITIES: left foot afo    Operative Procedures: Procedure(s) (LRB):  LUMBAR LAMINECTOMY 1 LEVEL (N/A)  Radiology:   Mri Spine Thoracic (w+wo) (cpt=72157)    Result Date: 9/19/2020  CONCLUSION:   Mild endplate degenera mg total) by mouth 2 (two) times daily as needed. pregabalin 50 MG Caps  Commonly known as: LYRICA  Take 1 capsule (50 mg total) by mouth 2 (two) times daily.         CHANGE how you take these medications    HYDROcodone-acetaminophen  MG Tabs  Com [facility] Team  Pager [phone]  Answering Service number: [phone]  9/22/2020      SEE ATTENDING NOTE BELOW      Patient seen and examined independently. Discussed with APN and agree with note above    Patient improved.   Stable

## 2021-02-12 ENCOUNTER — LAB ENCOUNTER (OUTPATIENT)
Dept: LAB | Facility: HOSPITAL | Age: 56
End: 2021-02-12
Attending: ORTHOPAEDIC SURGERY
Payer: MEDICAID

## 2021-02-12 DIAGNOSIS — Z01.818 PREOP TESTING: ICD-10-CM

## 2021-02-12 LAB — SARS-COV-2 RNA RESP QL NAA+PROBE: NOT DETECTED

## 2021-02-15 ENCOUNTER — APPOINTMENT (OUTPATIENT)
Dept: GENERAL RADIOLOGY | Facility: HOSPITAL | Age: 56
End: 2021-02-15
Attending: ORTHOPAEDIC SURGERY
Payer: MEDICAID

## 2021-02-15 ENCOUNTER — ANESTHESIA EVENT (OUTPATIENT)
Dept: SURGERY | Facility: HOSPITAL | Age: 56
End: 2021-02-15
Payer: MEDICAID

## 2021-02-15 ENCOUNTER — HOSPITAL ENCOUNTER (OUTPATIENT)
Facility: HOSPITAL | Age: 56
Discharge: HOME | End: 2021-02-16
Attending: ORTHOPAEDIC SURGERY | Admitting: ORTHOPAEDIC SURGERY
Payer: MEDICAID

## 2021-02-15 ENCOUNTER — ANESTHESIA (OUTPATIENT)
Dept: SURGERY | Facility: HOSPITAL | Age: 56
End: 2021-02-15
Payer: MEDICAID

## 2021-02-15 DIAGNOSIS — M48.062 SPINAL STENOSIS OF LUMBAR REGION WITH NEUROGENIC CLAUDICATION: ICD-10-CM

## 2021-02-15 DIAGNOSIS — R29.898 WEAKNESS OF LOWER EXTREMITY, UNSPECIFIED LATERALITY: ICD-10-CM

## 2021-02-15 DIAGNOSIS — Z01.818 PREOP TESTING: Primary | ICD-10-CM

## 2021-02-15 PROCEDURE — 72020 X-RAY EXAM OF SPINE 1 VIEW: CPT | Performed by: ORTHOPAEDIC SURGERY

## 2021-02-15 PROCEDURE — 0SB20ZZ EXCISION OF LUMBAR VERTEBRAL DISC, OPEN APPROACH: ICD-10-PCS | Performed by: ORTHOPAEDIC SURGERY

## 2021-02-15 PROCEDURE — 01NB0ZZ RELEASE LUMBAR NERVE, OPEN APPROACH: ICD-10-PCS | Performed by: ORTHOPAEDIC SURGERY

## 2021-02-15 RX ORDER — MORPHINE SULFATE 4 MG/ML
4 INJECTION, SOLUTION INTRAMUSCULAR; INTRAVENOUS EVERY 2 HOUR PRN
Status: DISCONTINUED | OUTPATIENT
Start: 2021-02-15 | End: 2021-02-16

## 2021-02-15 RX ORDER — SCOLOPAMINE TRANSDERMAL SYSTEM 1 MG/1
1 PATCH, EXTENDED RELEASE TRANSDERMAL ONCE
Status: DISCONTINUED | OUTPATIENT
Start: 2021-02-15 | End: 2021-02-16

## 2021-02-15 RX ORDER — METOCLOPRAMIDE 10 MG/1
10 TABLET ORAL ONCE
Status: COMPLETED | OUTPATIENT
Start: 2021-02-15 | End: 2021-02-15

## 2021-02-15 RX ORDER — SODIUM PHOSPHATE, DIBASIC AND SODIUM PHOSPHATE, MONOBASIC 7; 19 G/133ML; G/133ML
1 ENEMA RECTAL ONCE AS NEEDED
Status: DISCONTINUED | OUTPATIENT
Start: 2021-02-15 | End: 2021-02-16

## 2021-02-15 RX ORDER — HYDROCODONE BITARTRATE AND ACETAMINOPHEN 5; 325 MG/1; MG/1
2 TABLET ORAL AS NEEDED
Status: DISCONTINUED | OUTPATIENT
Start: 2021-02-15 | End: 2021-02-15 | Stop reason: HOSPADM

## 2021-02-15 RX ORDER — SODIUM CHLORIDE 9 MG/ML
INJECTION, SOLUTION INTRAVENOUS CONTINUOUS
Status: DISCONTINUED | OUTPATIENT
Start: 2021-02-15 | End: 2021-02-16

## 2021-02-15 RX ORDER — HYDROMORPHONE HYDROCHLORIDE 1 MG/ML
0.2 INJECTION, SOLUTION INTRAMUSCULAR; INTRAVENOUS; SUBCUTANEOUS EVERY 5 MIN PRN
Status: DISCONTINUED | OUTPATIENT
Start: 2021-02-15 | End: 2021-02-15 | Stop reason: HOSPADM

## 2021-02-15 RX ORDER — DEXAMETHASONE SODIUM PHOSPHATE 4 MG/ML
10 VIAL (ML) INJECTION ONCE
Status: COMPLETED | OUTPATIENT
Start: 2021-02-15 | End: 2021-02-15

## 2021-02-15 RX ORDER — TIZANIDINE 2 MG/1
2 TABLET ORAL ONCE
Status: COMPLETED | OUTPATIENT
Start: 2021-02-15 | End: 2021-02-15

## 2021-02-15 RX ORDER — ROCURONIUM BROMIDE 10 MG/ML
INJECTION, SOLUTION INTRAVENOUS AS NEEDED
Status: DISCONTINUED | OUTPATIENT
Start: 2021-02-15 | End: 2021-02-15 | Stop reason: SURG

## 2021-02-15 RX ORDER — HYDROCODONE BITARTRATE AND ACETAMINOPHEN 5; 325 MG/1; MG/1
2 TABLET ORAL EVERY 4 HOURS PRN
Status: DISCONTINUED | OUTPATIENT
Start: 2021-02-15 | End: 2021-02-16

## 2021-02-15 RX ORDER — ONDANSETRON 2 MG/ML
INJECTION INTRAMUSCULAR; INTRAVENOUS AS NEEDED
Status: DISCONTINUED | OUTPATIENT
Start: 2021-02-15 | End: 2021-02-15 | Stop reason: SURG

## 2021-02-15 RX ORDER — DOCUSATE SODIUM 100 MG/1
100 CAPSULE, LIQUID FILLED ORAL 2 TIMES DAILY
Status: DISCONTINUED | OUTPATIENT
Start: 2021-02-15 | End: 2021-02-16

## 2021-02-15 RX ORDER — CEFAZOLIN SODIUM/WATER 2 G/20 ML
2 SYRINGE (ML) INTRAVENOUS ONCE
Status: COMPLETED | OUTPATIENT
Start: 2021-02-15 | End: 2021-02-15

## 2021-02-15 RX ORDER — OXYCODONE HYDROCHLORIDE 5 MG/1
10 TABLET ORAL ONCE
Status: COMPLETED | OUTPATIENT
Start: 2021-02-15 | End: 2021-02-15

## 2021-02-15 RX ORDER — HYDROMORPHONE HYDROCHLORIDE 1 MG/ML
0.6 INJECTION, SOLUTION INTRAMUSCULAR; INTRAVENOUS; SUBCUTANEOUS EVERY 5 MIN PRN
Status: DISCONTINUED | OUTPATIENT
Start: 2021-02-15 | End: 2021-02-15 | Stop reason: HOSPADM

## 2021-02-15 RX ORDER — EPHEDRINE SULFATE 50 MG/ML
INJECTION, SOLUTION INTRAVENOUS AS NEEDED
Status: DISCONTINUED | OUTPATIENT
Start: 2021-02-15 | End: 2021-02-15 | Stop reason: SURG

## 2021-02-15 RX ORDER — MORPHINE SULFATE 10 MG/ML
6 INJECTION, SOLUTION INTRAMUSCULAR; INTRAVENOUS EVERY 10 MIN PRN
Status: DISCONTINUED | OUTPATIENT
Start: 2021-02-15 | End: 2021-02-15 | Stop reason: HOSPADM

## 2021-02-15 RX ORDER — GLYCOPYRROLATE 0.2 MG/ML
INJECTION, SOLUTION INTRAMUSCULAR; INTRAVENOUS AS NEEDED
Status: DISCONTINUED | OUTPATIENT
Start: 2021-02-15 | End: 2021-02-15 | Stop reason: SURG

## 2021-02-15 RX ORDER — ACETAMINOPHEN 325 MG/1
650 TABLET ORAL EVERY 4 HOURS PRN
Status: DISCONTINUED | OUTPATIENT
Start: 2021-02-15 | End: 2021-02-16

## 2021-02-15 RX ORDER — PROCHLORPERAZINE EDISYLATE 5 MG/ML
5 INJECTION INTRAMUSCULAR; INTRAVENOUS ONCE AS NEEDED
Status: DISCONTINUED | OUTPATIENT
Start: 2021-02-15 | End: 2021-02-15 | Stop reason: HOSPADM

## 2021-02-15 RX ORDER — HYDROMORPHONE HYDROCHLORIDE 1 MG/ML
INJECTION, SOLUTION INTRAMUSCULAR; INTRAVENOUS; SUBCUTANEOUS AS NEEDED
Status: DISCONTINUED | OUTPATIENT
Start: 2021-02-15 | End: 2021-02-15 | Stop reason: SURG

## 2021-02-15 RX ORDER — ONDANSETRON 2 MG/ML
4 INJECTION INTRAMUSCULAR; INTRAVENOUS EVERY 6 HOURS PRN
Status: DISCONTINUED | OUTPATIENT
Start: 2021-02-15 | End: 2021-02-16

## 2021-02-15 RX ORDER — NALOXONE HYDROCHLORIDE 0.4 MG/ML
80 INJECTION, SOLUTION INTRAMUSCULAR; INTRAVENOUS; SUBCUTANEOUS AS NEEDED
Status: DISCONTINUED | OUTPATIENT
Start: 2021-02-15 | End: 2021-02-15 | Stop reason: HOSPADM

## 2021-02-15 RX ORDER — DEXAMETHASONE SODIUM PHOSPHATE 4 MG/ML
VIAL (ML) INJECTION AS NEEDED
Status: DISCONTINUED | OUTPATIENT
Start: 2021-02-15 | End: 2021-02-15 | Stop reason: SURG

## 2021-02-15 RX ORDER — MORPHINE SULFATE 4 MG/ML
2 INJECTION, SOLUTION INTRAMUSCULAR; INTRAVENOUS EVERY 10 MIN PRN
Status: DISCONTINUED | OUTPATIENT
Start: 2021-02-15 | End: 2021-02-15 | Stop reason: HOSPADM

## 2021-02-15 RX ORDER — ONDANSETRON 2 MG/ML
4 INJECTION INTRAMUSCULAR; INTRAVENOUS ONCE AS NEEDED
Status: DISCONTINUED | OUTPATIENT
Start: 2021-02-15 | End: 2021-02-15 | Stop reason: HOSPADM

## 2021-02-15 RX ORDER — MORPHINE SULFATE 4 MG/ML
4 INJECTION, SOLUTION INTRAMUSCULAR; INTRAVENOUS EVERY 10 MIN PRN
Status: DISCONTINUED | OUTPATIENT
Start: 2021-02-15 | End: 2021-02-15 | Stop reason: HOSPADM

## 2021-02-15 RX ORDER — ACETAMINOPHEN 325 MG/1
650 TABLET ORAL EVERY 6 HOURS PRN
Status: DISCONTINUED | OUTPATIENT
Start: 2021-02-15 | End: 2021-02-16

## 2021-02-15 RX ORDER — SODIUM CHLORIDE, SODIUM LACTATE, POTASSIUM CHLORIDE, CALCIUM CHLORIDE 600; 310; 30; 20 MG/100ML; MG/100ML; MG/100ML; MG/100ML
INJECTION, SOLUTION INTRAVENOUS CONTINUOUS
Status: DISCONTINUED | OUTPATIENT
Start: 2021-02-15 | End: 2021-02-16

## 2021-02-15 RX ORDER — SODIUM CHLORIDE, SODIUM LACTATE, POTASSIUM CHLORIDE, CALCIUM CHLORIDE 600; 310; 30; 20 MG/100ML; MG/100ML; MG/100ML; MG/100ML
INJECTION, SOLUTION INTRAVENOUS CONTINUOUS
Status: DISCONTINUED | OUTPATIENT
Start: 2021-02-15 | End: 2021-02-15 | Stop reason: HOSPADM

## 2021-02-15 RX ORDER — HALOPERIDOL 5 MG/ML
0.25 INJECTION INTRAMUSCULAR ONCE AS NEEDED
Status: DISCONTINUED | OUTPATIENT
Start: 2021-02-15 | End: 2021-02-15 | Stop reason: HOSPADM

## 2021-02-15 RX ORDER — BISACODYL 10 MG
10 SUPPOSITORY, RECTAL RECTAL
Status: DISCONTINUED | OUTPATIENT
Start: 2021-02-15 | End: 2021-02-16

## 2021-02-15 RX ORDER — NEOSTIGMINE METHYLSULFATE 1 MG/ML
INJECTION INTRAVENOUS AS NEEDED
Status: DISCONTINUED | OUTPATIENT
Start: 2021-02-15 | End: 2021-02-15 | Stop reason: SURG

## 2021-02-15 RX ORDER — FAMOTIDINE 20 MG/1
20 TABLET ORAL ONCE
Status: COMPLETED | OUTPATIENT
Start: 2021-02-15 | End: 2021-02-15

## 2021-02-15 RX ORDER — MIDAZOLAM HYDROCHLORIDE 1 MG/ML
INJECTION INTRAMUSCULAR; INTRAVENOUS AS NEEDED
Status: DISCONTINUED | OUTPATIENT
Start: 2021-02-15 | End: 2021-02-15 | Stop reason: SURG

## 2021-02-15 RX ORDER — MORPHINE SULFATE 2 MG/ML
2 INJECTION, SOLUTION INTRAMUSCULAR; INTRAVENOUS EVERY 2 HOUR PRN
Status: DISCONTINUED | OUTPATIENT
Start: 2021-02-15 | End: 2021-02-16

## 2021-02-15 RX ORDER — ACETAMINOPHEN 500 MG
1000 TABLET ORAL ONCE
Status: COMPLETED | OUTPATIENT
Start: 2021-02-15 | End: 2021-02-15

## 2021-02-15 RX ORDER — HYDROCODONE BITARTRATE AND ACETAMINOPHEN 5; 325 MG/1; MG/1
1 TABLET ORAL EVERY 4 HOURS PRN
Status: DISCONTINUED | OUTPATIENT
Start: 2021-02-15 | End: 2021-02-16

## 2021-02-15 RX ORDER — MORPHINE SULFATE 2 MG/ML
1 INJECTION, SOLUTION INTRAMUSCULAR; INTRAVENOUS EVERY 2 HOUR PRN
Status: DISCONTINUED | OUTPATIENT
Start: 2021-02-15 | End: 2021-02-16

## 2021-02-15 RX ORDER — LIDOCAINE HYDROCHLORIDE 10 MG/ML
INJECTION, SOLUTION EPIDURAL; INFILTRATION; INTRACAUDAL; PERINEURAL AS NEEDED
Status: DISCONTINUED | OUTPATIENT
Start: 2021-02-15 | End: 2021-02-15 | Stop reason: SURG

## 2021-02-15 RX ORDER — HYDROCODONE BITARTRATE AND ACETAMINOPHEN 5; 325 MG/1; MG/1
1 TABLET ORAL AS NEEDED
Status: DISCONTINUED | OUTPATIENT
Start: 2021-02-15 | End: 2021-02-15 | Stop reason: HOSPADM

## 2021-02-15 RX ORDER — POLYETHYLENE GLYCOL 3350 17 G/17G
17 POWDER, FOR SOLUTION ORAL DAILY PRN
Status: DISCONTINUED | OUTPATIENT
Start: 2021-02-15 | End: 2021-02-16

## 2021-02-15 RX ORDER — HYDROMORPHONE HYDROCHLORIDE 1 MG/ML
0.4 INJECTION, SOLUTION INTRAMUSCULAR; INTRAVENOUS; SUBCUTANEOUS EVERY 5 MIN PRN
Status: DISCONTINUED | OUTPATIENT
Start: 2021-02-15 | End: 2021-02-15 | Stop reason: HOSPADM

## 2021-02-15 RX ADMIN — ROCURONIUM BROMIDE 50 MG: 10 INJECTION, SOLUTION INTRAVENOUS at 11:36:00

## 2021-02-15 RX ADMIN — LIDOCAINE HYDROCHLORIDE 50 MG: 10 INJECTION, SOLUTION EPIDURAL; INFILTRATION; INTRACAUDAL; PERINEURAL at 11:36:00

## 2021-02-15 RX ADMIN — EPHEDRINE SULFATE 10 MG: 50 INJECTION, SOLUTION INTRAVENOUS at 12:27:00

## 2021-02-15 RX ADMIN — EPHEDRINE SULFATE 10 MG: 50 INJECTION, SOLUTION INTRAVENOUS at 13:29:00

## 2021-02-15 RX ADMIN — EPHEDRINE SULFATE 10 MG: 50 INJECTION, SOLUTION INTRAVENOUS at 12:45:00

## 2021-02-15 RX ADMIN — HYDROMORPHONE HYDROCHLORIDE 0.5 MG: 1 INJECTION, SOLUTION INTRAMUSCULAR; INTRAVENOUS; SUBCUTANEOUS at 14:07:00

## 2021-02-15 RX ADMIN — SODIUM CHLORIDE, SODIUM LACTATE, POTASSIUM CHLORIDE, CALCIUM CHLORIDE: 600; 310; 30; 20 INJECTION, SOLUTION INTRAVENOUS at 13:39:00

## 2021-02-15 RX ADMIN — EPHEDRINE SULFATE 10 MG: 50 INJECTION, SOLUTION INTRAVENOUS at 13:00:00

## 2021-02-15 RX ADMIN — SODIUM CHLORIDE, SODIUM LACTATE, POTASSIUM CHLORIDE, CALCIUM CHLORIDE: 600; 310; 30; 20 INJECTION, SOLUTION INTRAVENOUS at 14:50:00

## 2021-02-15 RX ADMIN — CEFAZOLIN SODIUM/WATER 2 G: 2 G/20 ML SYRINGE (ML) INTRAVENOUS at 11:44:00

## 2021-02-15 RX ADMIN — DEXAMETHASONE SODIUM PHOSPHATE 4 MG: 4 MG/ML VIAL (ML) INJECTION at 11:44:00

## 2021-02-15 RX ADMIN — GLYCOPYRROLATE 0.4 MG: 0.2 INJECTION, SOLUTION INTRAMUSCULAR; INTRAVENOUS at 14:59:00

## 2021-02-15 RX ADMIN — NEOSTIGMINE METHYLSULFATE 2 MG: 1 INJECTION INTRAVENOUS at 14:59:00

## 2021-02-15 RX ADMIN — ONDANSETRON 4 MG: 2 INJECTION INTRAMUSCULAR; INTRAVENOUS at 13:51:00

## 2021-02-15 RX ADMIN — MIDAZOLAM HYDROCHLORIDE 2 MG: 1 INJECTION INTRAMUSCULAR; INTRAVENOUS at 11:35:00

## 2021-02-15 RX ADMIN — HYDROMORPHONE HYDROCHLORIDE 0.5 MG: 1 INJECTION, SOLUTION INTRAMUSCULAR; INTRAVENOUS; SUBCUTANEOUS at 14:59:00

## 2021-02-15 NOTE — ANESTHESIA PROCEDURE NOTES
Airway  Date/Time: 2/15/2021 11:40 AM  Urgency: Elective    Airway not difficult    General Information and Staff    Patient location during procedure: OR  Anesthesiologist: Angeles Alford MD  Resident/CRNA: Laina Crystal CRNA  Performed: Migdalia Branch

## 2021-02-15 NOTE — CONSULTS
General Medicine Consult      Reason for consult: medical management     Consulted by: Dr. Jamal Zamora    PCP: Cb Dang MD      History of Present Illness:   Patient is a 47 yo male with hx Spinal stenosis with left lower ext foot drop, with recent Alcohol use: Not Currently      Alcohol/week: 12.0 standard drinks      Types: 6 Cans of beer, 6 Shots of liquor per week       Fam Hx  Family History   Problem Relation Age of Onset   • No Known Problems Father    • No Known Problems Mother        Review (cpt=71046)    Result Date: 2/15/2021  IMPRESSION:  No evidence of acute cardiopulmonary process.         ASSESSMENT / PLAN:  Patient is a 49 yo male with hx Spinal stenosis with left lower ext foot drop, with recent hs of L2/L3 Microdiscectomy in 9/2020, w

## 2021-02-15 NOTE — H&P
Patient: Gerda Gist  Medical Record Number: [id number]  Referring Physician:  No ref.  provider found  PCP: Christy Lorenzo MD    HISTORY OF CHIEF COMPLAINT:    CC: Lower extremity weakness     HPI: Brie Haywood is a 48year old male who is • LUMBAR LAMINECTOMY 1 LEVEL N/A 9/21/2020    Performed by González Campo MD at [facility] OR   • [address] [facility]     • TRANSFORAMINAL LUMBAR EPIDURAL STEROID INJECTION SINGLE LEVEL N/A 9/14/2020    Performed by Jackie Kang MD at

## 2021-02-15 NOTE — ANESTHESIA PREPROCEDURE EVALUATION
Anesthesia PreOp Note    HPI:     Sina Dc is a 48year old male who presents for preoperative consultation requested by: Grant Diez MD    Date of Surgery: 2/15/2021    Procedure(s):  LUMBAR LAMINECTOMY 2 LEVEL  Indication: Spinal huey ceFAZolin sodium (ANCEF/KEFZOL) 2 GM/20ML premix IV syringe 2 g, 2 g, Intravenous, Once, Alissa Simpson MD    No current [facility]-ordered outpatient medications on file.       No Known Allergies    Family History   Problem Relation Age of Onset   • No Kn Concerns:        Not on file    Social History Narrative      Single. Lives alone. 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 1 daughter-25 (healthy)      Lifts weights. No cardio.       4 half brothers-healthy blood dyscrasia    Comments: obesity  Abdominal              Anesthesia Plan:   ASA:  2  Plan:   General  Airway:  ETT and Video laryngoscope  Informed Consent Plan and Risks Discussed With:  Patient      I have informed Juliano Bradley and/or legal g

## 2021-02-15 NOTE — ADDENDUM NOTE
Addendum  created 02/15/21 1547 by Bello Cabrera MD    Attestation recorded in [facility] 97 filed, Intraprocedure Staff edited

## 2021-02-15 NOTE — ANESTHESIA POSTPROCEDURE EVALUATION
Patient: Christiano Chavira    Procedure Summary     Date: 02/15/21 Room / Location: [facility] OR  / [facility] OR    Anesthesia Start: 2870 Anesthesia Stop: 4399    Procedure: LUMBAR LAMINECTOMY 2 LEVEL (Bilateral Spine Lumbar) Diagnosis:       Spinal st

## 2021-02-16 VITALS
DIASTOLIC BLOOD PRESSURE: 82 MMHG | SYSTOLIC BLOOD PRESSURE: 126 MMHG | TEMPERATURE: 99 F | HEART RATE: 88 BPM | HEIGHT: 71 IN | WEIGHT: 225 LBS | OXYGEN SATURATION: 97 % | RESPIRATION RATE: 18 BRPM | BODY MASS INDEX: 31.5 KG/M2

## 2021-02-16 LAB
ANION GAP SERPL CALC-SCNC: 4 MMOL/L (ref 0–18)
BASOPHILS # BLD AUTO: 0.03 X10(3) UL (ref 0–0.2)
BASOPHILS NFR BLD AUTO: 0.2 %
BUN BLD-MCNC: 10 MG/DL (ref 7–18)
BUN/CREAT SERPL: 8.4 (ref 10–20)
CALCIUM BLD-MCNC: 8.9 MG/DL (ref 8.5–10.1)
CHLORIDE SERPL-SCNC: 108 MMOL/L (ref 98–112)
CO2 SERPL-SCNC: 28 MMOL/L (ref 21–32)
CREAT BLD-MCNC: 1.19 MG/DL
DEPRECATED RDW RBC AUTO: 38.9 FL (ref 35.1–46.3)
EOSINOPHIL # BLD AUTO: 0 X10(3) UL (ref 0–0.7)
EOSINOPHIL NFR BLD AUTO: 0 %
ERYTHROCYTE [DISTWIDTH] IN BLOOD BY AUTOMATED COUNT: 14 % (ref 11–15)
GLUCOSE BLD-MCNC: 106 MG/DL (ref 70–99)
HCT VFR BLD AUTO: 37.9 %
HGB BLD-MCNC: 13.5 G/DL
IMM GRANULOCYTES # BLD AUTO: 0.04 X10(3) UL (ref 0–1)
IMM GRANULOCYTES NFR BLD: 0.3 %
LYMPHOCYTES # BLD AUTO: 1.76 X10(3) UL (ref 1–4)
LYMPHOCYTES NFR BLD AUTO: 13.7 %
MCH RBC QN AUTO: 27.4 PG (ref 26–34)
MCHC RBC AUTO-ENTMCNC: 35.6 G/DL (ref 31–37)
MCV RBC AUTO: 77 FL
MONOCYTES # BLD AUTO: 1.16 X10(3) UL (ref 0.1–1)
MONOCYTES NFR BLD AUTO: 9.1 %
NEUTROPHILS # BLD AUTO: 9.82 X10 (3) UL (ref 1.5–7.7)
NEUTROPHILS # BLD AUTO: 9.82 X10(3) UL (ref 1.5–7.7)
NEUTROPHILS NFR BLD AUTO: 76.7 %
OSMOLALITY SERPL CALC.SUM OF ELEC: 289 MOSM/KG (ref 275–295)
PLATELET # BLD AUTO: 232 10(3)UL (ref 150–450)
POTASSIUM SERPL-SCNC: 3.7 MMOL/L (ref 3.5–5.1)
RBC # BLD AUTO: 4.92 X10(6)UL
SODIUM SERPL-SCNC: 140 MMOL/L (ref 136–145)
WBC # BLD AUTO: 12.8 X10(3) UL (ref 4–11)

## 2021-02-16 PROCEDURE — 97530 THERAPEUTIC ACTIVITIES: CPT

## 2021-02-16 PROCEDURE — 97116 GAIT TRAINING THERAPY: CPT

## 2021-02-16 PROCEDURE — 80048 BASIC METABOLIC PNL TOTAL CA: CPT | Performed by: HOSPITALIST

## 2021-02-16 PROCEDURE — 97166 OT EVAL MOD COMPLEX 45 MIN: CPT

## 2021-02-16 PROCEDURE — 97161 PT EVAL LOW COMPLEX 20 MIN: CPT

## 2021-02-16 PROCEDURE — 85025 COMPLETE CBC W/AUTO DIFF WBC: CPT | Performed by: HOSPITALIST

## 2021-02-16 RX ORDER — SENNA AND DOCUSATE SODIUM 50; 8.6 MG/1; MG/1
1 TABLET, FILM COATED ORAL DAILY
Qty: 30 TABLET | Refills: 0 | Status: SHIPPED | OUTPATIENT
Start: 2021-02-16 | End: 2021-03-23

## 2021-02-16 RX ORDER — HYDROCODONE BITARTRATE AND ACETAMINOPHEN 5; 325 MG/1; MG/1
2 TABLET ORAL EVERY 4 HOURS PRN
Qty: 30 TABLET | Refills: 0 | Status: SHIPPED | OUTPATIENT
Start: 2021-02-16 | End: 2021-03-23

## 2021-02-16 NOTE — PLAN OF CARE
Patient cleared for discharge per ortho, medical, and therapy. Medications, discharge instructions, and follow up appointments reviewed with patient. Rx and belongings taken with patient. All questions answered prior to leaving. Stable upon discharge.  Disc appropriate and evaluate response  - Consider cultural and social influences on pain and pain management  - Manage/alleviate anxiety  - Utilize distraction and/or relaxation techniques  - Monitor for opioid side effects  - Notify MD/LIP if interventions un environment to reduce risk of injury  - Provide assistive devices as appropriate  - Consider OT/PT consult to assist with strengthening/mobility  - Encourage toileting schedule  2/16/2021 1255 by Eva Hutchinson RN  Outcome: Adequate for Discharge  2/16/20

## 2021-02-16 NOTE — OCCUPATIONAL THERAPY NOTE
OCCUPATIONAL THERAPY EVALUATION - INPATIENT     Room Number: 417/417-A  Evaluation Date: 2/16/2021  Type of Evaluation: Initial  Presenting Problem: Lumbar lami    Physician Order: IP Consult to Occupational Therapy  Reason for Therapy: ADL/IADL Dysfunct evaluated and presents with no skilled Occupational Therapy needs  at this time. Patient will be discharged from Occupational Therapy services. Please re-order if a new functional limitation presents during this admission.     OCCUPATIONAL THERAPY MEDICAL/ mechanics;Repositioning    ACTIVITY TOLERANCE  good    COGNITION  Alert and oriented x 4    SENSATION  Pt denied numbness/tingling    ACTIVITIES OF DAILY LIVING ASSESSMENT  AM-PAC ‘6-Clicks’ Inpatient Daily Activity Short Form  How much help from another p

## 2021-02-16 NOTE — PHYSICAL THERAPY NOTE
PHYSICAL THERAPY EVALUATION - INPATIENT     Room Number: 417/417-A  Evaluation Date: 2/16/2021  Type of Evaluation: Initial   Physician Order: See Comment for Specific Order(post sx)    Presenting Problem: s/p lumbar laminectomy 2/15  Reason for Therapy: post-op expectations, post op mobility program, pt with good understanding. All pt questions answered. Pt assisted to seated in bedside chair post-activity, all needs in place, left with OT present at PT exit.      Patient's current functional deficits incl INJECTION SINGLE LEVEL N/A 9/14/2020    Performed by Red Lee MD at [address]  Type of Home: Apartment   Home Layout: One level  Stairs to Enter : 30  Railing: Yes  Stairs to Bedroom: 0  Lives With: Significant other  Drives:  Lenora Monse and standing up from a chair with arms (e.g., wheelchair, bedside commode, etc.): A Little   -   Moving from lying on back to sitting on the side of the bed?: A Little   How much help from another person does the patient currently need. ..   -   Moving to a #3   Current Status    Goal #4 Patient will negotiate 12 stairs/one curb w/ assistive device and supervision   Goal #4   Current Status    Goal #5 Patient to demonstrate independence with home activity/exercise instructions provided to patient in preparati

## 2021-02-16 NOTE — DISCHARGE SUMMARY
General Medicine Discharge Summary     Patient ID:  Wicho Swartz  48year old  8/31/1965    Admit date: 2/15/2021    Discharge date and time: 2/16/21    Attending Physician: Claude Barrios,

## 2021-02-16 NOTE — PLAN OF CARE
Patient is alert and oriented X3. Lott in place. Numbness/tingling to toes to left foot, patient states hx of foot drop as well. Circulation and motion intact. Dressing to back is CDI. Hemovac to surgical site.  Pain to back relieved with norco and morphin Notify MD/LIP if interventions unsuccessful or patient reports new pain  - Anticipate increased pain with activity and pre-medicate as appropriate  Outcome: Progressing     Problem: DISCHARGE PLANNING - CASE MANAGEMENT  Goal: Discharge to post-acute care o with transfers and ambulation using safe patient handling equipment as needed  - Ensure adequate protection for wounds/incisions during mobilization  - Obtain PT/OT consults as needed  - Advance activity as appropriate  - Communicate ordered activity level

## 2021-02-16 NOTE — PLAN OF CARE
Alert and oriented x 3, RA, SCD, Lott in place, hemovac in place, general diet. Gauze and tegaderm.    Problem: Patient Centered Care  Goal: Patient preferences are identified and integrated in the patient's plan of care  Description: Interventions:  - [place]

## 2021-02-16 NOTE — PLAN OF CARE
Problem: Patient Centered Care  Goal: Patient preferences are identified and integrated in the patient's plan of care  Description: Interventions:  - What would you like us to know as we care for you?   - Provide timely, complete, and accurate informatio and payer coverage in collaboration with the physician and health care team  - Communicate with and update the patient/family, physician, and health care team regarding progress on the discharge plan  - Arrange appropriate transportation to post-acute mandy morphine. Call light within reach. Plan is for patient to go home.

## 2021-02-25 NOTE — OPERATIVE REPORT
Operative Note    DOS: 2/15/2021  Patient Name: Gerda Gist  Preoperative Diagnosis:   1.) Lumbar Radiculopathy  2.) Lumbar Spondylosis, stenosis  Postoperative Diagnosis: Same  Primary Surgeon: Olga Dee MD  Assistant: Avril Weber The patient was met in the preop holding area, we reviewed elements of the patient's history and physical examination along with the planned surgical procedure. The patient was in agreement and the surgical site was marked with indelible ink.   The patient All bleeders were controlled using bipolar and unipolar electrocautery. The wound was thoroughly irrigated at the time of closure. There was no active bleeding.   Closure was done in layers with interrupted 0 Vicryl for the fascia, 2-0 Vicryl

## 2021-04-14 ENCOUNTER — IMMUNIZATION (OUTPATIENT)
Dept: LAB | Facility: HOSPITAL | Age: 56
End: 2021-04-14
Attending: EMERGENCY MEDICINE
Payer: MEDICAID

## 2021-04-14 DIAGNOSIS — Z23 NEED FOR VACCINATION: Primary | ICD-10-CM

## 2021-04-14 PROCEDURE — 0011A SARSCOV2 VAC 100MCG/0.5ML IM: CPT

## 2021-05-12 ENCOUNTER — IMMUNIZATION (OUTPATIENT)
Dept: LAB | Facility: HOSPITAL | Age: 56
End: 2021-05-12
Attending: EMERGENCY MEDICINE
Payer: MEDICAID

## 2021-05-12 DIAGNOSIS — Z23 NEED FOR VACCINATION: Primary | ICD-10-CM

## 2021-05-12 PROCEDURE — 0012A SARSCOV2 VAC 100MCG/0.5ML IM: CPT

## 2021-06-19 ENCOUNTER — HOSPITAL ENCOUNTER (EMERGENCY)
Facility: HOSPITAL | Age: 56
Discharge: HOME | End: 2021-06-19
Payer: MEDICAID

## 2021-06-19 VITALS
HEART RATE: 82 BPM | RESPIRATION RATE: 18 BRPM | SYSTOLIC BLOOD PRESSURE: 125 MMHG | DIASTOLIC BLOOD PRESSURE: 88 MMHG | WEIGHT: 225 LBS | BODY MASS INDEX: 31 KG/M2 | TEMPERATURE: 98 F | OXYGEN SATURATION: 98 %

## 2021-06-19 DIAGNOSIS — H60.502 ACUTE OTITIS EXTERNA OF LEFT EAR, UNSPECIFIED TYPE: Primary | ICD-10-CM

## 2021-06-19 PROCEDURE — 99283 EMERGENCY DEPT VISIT LOW MDM: CPT

## 2021-06-19 RX ORDER — NEOMYCIN SULFATE, POLYMYXIN B SULFATE AND HYDROCORTISONE 10; 3.5; 1 MG/ML; MG/ML; [USP'U]/ML
4 SUSPENSION/ DROPS AURICULAR (OTIC) 4 TIMES DAILY
Qty: 10 ML | Refills: 0 | Status: SHIPPED | OUTPATIENT
Start: 2021-06-19 | End: 2021-06-29

## 2021-06-20 NOTE — ED PROVIDER NOTES
Patient Seen in: [facility] AND [facility] Emergency Department      History   Patient presents with:  Ear Problem Pain    Stated Complaint: Ear infection    HPI/Subjective:   HPI    50-year-old male presents the emergency department for evaluation.   Patient sta 2229]   /81   Pulse 83   Resp 18   Temp 98.2 °F (36.8 °C)   Temp src Temporal   SpO2 98 %   O2 Device None (Room air)       Current:/88   Pulse 82   Temp 98.2 °F (36.8 °C) (Temporal)   Resp 18   Wt 102.1 kg   SpO2 98%   BMI 31.38 kg/m² follow-up with his primary care doctor this week for reevaluation. Will start patient on drops given there is mild erythema and swelling to the canal.  Patient verbalized understanding's of the importance of a reevaluation of his ear.   Return to the ER wi

## 2021-07-30 PROBLEM — H91.93 HEARING DEFICIT, BILATERAL: Status: ACTIVE | Noted: 2021-07-30

## 2021-07-30 PROBLEM — D17.9 MULTIPLE LIPOMAS: Status: ACTIVE | Noted: 2021-07-30

## 2021-08-04 ENCOUNTER — LAB SERVICES (OUTPATIENT)
Dept: URGENT CARE | Age: 56
End: 2021-08-04

## 2021-08-04 ENCOUNTER — TELEPHONE (OUTPATIENT)
Dept: SCHEDULING | Age: 56
End: 2021-08-04

## 2021-08-04 DIAGNOSIS — Z20.822 CLOSE EXPOSURE TO COVID-19 VIRUS: Primary | ICD-10-CM

## 2021-08-04 PROCEDURE — U0005 INFEC AGEN DETEC AMPLI PROBE: HCPCS | Performed by: OBSTETRICS & GYNECOLOGY

## 2021-08-04 PROCEDURE — U0003 INFECTIOUS AGENT DETECTION BY NUCLEIC ACID (DNA OR RNA); SEVERE ACUTE RESPIRATORY SYNDROME CORONAVIRUS 2 (SARS-COV-2) (CORONAVIRUS DISEASE [COVID-19]), AMPLIFIED PROBE TECHNIQUE, MAKING USE OF HIGH THROUGHPUT TECHNOLOGIES AS DESCRIBED BY CMS-2020-01-R: HCPCS | Performed by: OBSTETRICS & GYNECOLOGY

## 2021-08-05 LAB
SARS-COV-2 RNA RESP QL NAA+PROBE: NOT DETECTED
SERVICE CMNT-IMP: NORMAL
SERVICE CMNT-IMP: NORMAL

## 2021-08-13 ENCOUNTER — TELEPHONE (OUTPATIENT)
Dept: SCHEDULING | Age: 56
End: 2021-08-13

## 2021-08-15 ENCOUNTER — LAB SERVICES (OUTPATIENT)
Dept: URGENT CARE | Age: 56
End: 2021-08-15

## 2021-08-15 DIAGNOSIS — Z11.52 ENCOUNTER FOR SCREENING LABORATORY TESTING FOR COVID-19 VIRUS IN ASYMPTOMATIC PATIENT: Primary | ICD-10-CM

## 2021-08-15 DIAGNOSIS — Z01.812 ENCOUNTER FOR SCREENING LABORATORY TESTING FOR COVID-19 VIRUS IN ASYMPTOMATIC PATIENT: Primary | ICD-10-CM

## 2021-08-15 PROCEDURE — U0003 INFECTIOUS AGENT DETECTION BY NUCLEIC ACID (DNA OR RNA); SEVERE ACUTE RESPIRATORY SYNDROME CORONAVIRUS 2 (SARS-COV-2) (CORONAVIRUS DISEASE [COVID-19]), AMPLIFIED PROBE TECHNIQUE, MAKING USE OF HIGH THROUGHPUT TECHNOLOGIES AS DESCRIBED BY CMS-2020-01-R: HCPCS | Performed by: OBSTETRICS & GYNECOLOGY

## 2021-08-15 PROCEDURE — U0005 INFEC AGEN DETEC AMPLI PROBE: HCPCS | Performed by: OBSTETRICS & GYNECOLOGY

## 2021-08-16 ENCOUNTER — TELEPHONE (OUTPATIENT)
Dept: URGENT CARE | Age: 56
End: 2021-08-16

## 2021-08-16 ENCOUNTER — TELEPHONE (OUTPATIENT)
Dept: SCHEDULING | Age: 56
End: 2021-08-16

## 2021-08-16 LAB
SARS-COV-2 RNA RESP QL NAA+PROBE: NOT DETECTED
SERVICE CMNT-IMP: NORMAL
SERVICE CMNT-IMP: NORMAL

## 2021-11-20 ENCOUNTER — LAB ENCOUNTER (OUTPATIENT)
Dept: LAB | Facility: HOSPITAL | Age: 56
End: 2021-11-20
Attending: INTERNAL MEDICINE
Payer: MEDICAID

## 2021-11-20 DIAGNOSIS — Z01.818 PREOP TESTING: ICD-10-CM

## 2021-11-23 ENCOUNTER — HOSPITAL ENCOUNTER (OUTPATIENT)
Facility: HOSPITAL | Age: 56
Setting detail: HOSPITAL OUTPATIENT SURGERY
Discharge: HOME | End: 2021-11-23
Attending: INTERNAL MEDICINE | Admitting: INTERNAL MEDICINE
Payer: MEDICAID

## 2021-11-23 DIAGNOSIS — Z12.11 COLON CANCER SCREENING: ICD-10-CM

## 2021-11-23 DIAGNOSIS — Z01.818 PREOP TESTING: Primary | ICD-10-CM

## 2021-11-23 PROCEDURE — 36415 COLL VENOUS BLD VENIPUNCTURE: CPT | Performed by: INTERNAL MEDICINE

## 2021-11-23 PROCEDURE — 99152 MOD SED SAME PHYS/QHP 5/>YRS: CPT | Performed by: INTERNAL MEDICINE

## 2021-11-23 PROCEDURE — 0DBM8ZX EXCISION OF DESCENDING COLON, VIA NATURAL OR ARTIFICIAL OPENING ENDOSCOPIC, DIAGNOSTIC: ICD-10-PCS | Performed by: INTERNAL MEDICINE

## 2021-11-23 PROCEDURE — 88305 TISSUE EXAM BY PATHOLOGIST: CPT | Performed by: INTERNAL MEDICINE

## 2021-11-23 RX ORDER — MIDAZOLAM HYDROCHLORIDE 1 MG/ML
INJECTION INTRAMUSCULAR; INTRAVENOUS
Status: DISCONTINUED | OUTPATIENT
Start: 2021-11-23 | End: 2021-11-23

## 2021-11-23 RX ORDER — SODIUM CHLORIDE, SODIUM LACTATE, POTASSIUM CHLORIDE, CALCIUM CHLORIDE 600; 310; 30; 20 MG/100ML; MG/100ML; MG/100ML; MG/100ML
INJECTION, SOLUTION INTRAVENOUS CONTINUOUS
Status: DISCONTINUED | OUTPATIENT
Start: 2021-11-23 | End: 2021-11-23

## 2021-11-23 NOTE — H&P
HISTORY AND PHYSICAL FOR ENDOSCOPIC PROCEDURE      Juliano Bradley Patient Status:  [facility] Outpatient Surgery    1965 MRN [id number]   Location [facility] ENDOSCOPY LAB SUITES Attending Brittany Lundberg MD   Hosp Day # 0 PCP [facility] 5' 11\" (1.803 m), weight 235 lb (106.6 kg), SpO2 98 %. General: Appears alert, oriented x3 and in no acute distress. HEENT: Normal. No neck vein distention. Thyroid not enlarged. No lymphadenopathy. CV: S1 and S2 normal.  No murmurs or gallops.   Zuleima Guan

## 2021-11-23 NOTE — DISCHARGE SUMMARY
ENDOSCOPY DISCHARGE INSTRUCTIONS    Procedure Performed:   Colonoscopy    Endoscopist: No name on file. FINDINGS:   There was a small lipoma (collection of fat) in the colon and biopsies taken. Lipomas are completely benign.   There were also internal hem

## 2021-11-23 NOTE — OPERATIVE REPORT
Colonoscopy Operative Report    Burgess Heredia Patient Status:  [facility]    1965 MRN [id number]   Location [facility] ENDOSCOPY LAB SUITES Attending Jaylen Whaley MD Scale:  2  Dale Bowel Prep Score:  7 (2, 2, 3)    Findings:   Colon:  1 cm subepithelial lesion in the descending colon with soft indentation with biopsy forceps consistent with lipoma. This was noted at 45 cm from the anal verge.   Biopsies taken from s

## 2021-11-24 VITALS
RESPIRATION RATE: 20 BRPM | BODY MASS INDEX: 32.9 KG/M2 | OXYGEN SATURATION: 96 % | HEIGHT: 71 IN | HEART RATE: 66 BPM | DIASTOLIC BLOOD PRESSURE: 89 MMHG | SYSTOLIC BLOOD PRESSURE: 141 MMHG | WEIGHT: 235 LBS | TEMPERATURE: 98 F

## 2021-11-24 NOTE — PROGRESS NOTES
11/24/2021  [address] [place] [address] Yoselin [place] [id number]    Dear Kim Baca,       Here are the biopsy/pathology findings from your recent Colonoscopy :     There was a lipoma which is a benign collection of fat in the colon which does not have an

## 2022-01-25 ENCOUNTER — IMMUNIZATION (OUTPATIENT)
Dept: LAB | Facility: HOSPITAL | Age: 57
End: 2022-01-25
Attending: EMERGENCY MEDICINE
Payer: MEDICAID

## 2022-01-25 DIAGNOSIS — Z23 NEED FOR VACCINATION: Primary | ICD-10-CM

## 2022-01-25 PROCEDURE — 0064A SARSCOV2 VAC 50MCG/0.25ML IM: CPT

## 2022-02-04 ENCOUNTER — TELEPHONE (OUTPATIENT)
Dept: SCHEDULING | Age: 57
End: 2022-02-04

## 2022-04-24 ENCOUNTER — HOSPITAL ENCOUNTER (EMERGENCY)
Facility: HOSPITAL | Age: 57
Discharge: HOME | End: 2022-04-24
Attending: EMERGENCY MEDICINE
Payer: MEDICAID

## 2022-04-24 ENCOUNTER — APPOINTMENT (OUTPATIENT)
Dept: MRI IMAGING | Facility: HOSPITAL | Age: 57
End: 2022-04-24
Attending: EMERGENCY MEDICINE
Payer: MEDICAID

## 2022-04-24 VITALS
SYSTOLIC BLOOD PRESSURE: 151 MMHG | OXYGEN SATURATION: 100 % | DIASTOLIC BLOOD PRESSURE: 80 MMHG | RESPIRATION RATE: 20 BRPM | WEIGHT: 225 LBS | TEMPERATURE: 99 F | BODY MASS INDEX: 31.5 KG/M2 | HEIGHT: 71 IN | HEART RATE: 69 BPM

## 2022-04-24 DIAGNOSIS — R03.0 ELEVATED BLOOD PRESSURE READING: ICD-10-CM

## 2022-04-24 DIAGNOSIS — R42 DIZZINESS: Primary | ICD-10-CM

## 2022-04-24 LAB
ANION GAP SERPL CALC-SCNC: 3 MMOL/L (ref 0–18)
BASOPHILS # BLD AUTO: 0.05 X10(3) UL (ref 0–0.2)
BASOPHILS NFR BLD AUTO: 0.6 %
BILIRUB UR QL: NEGATIVE
BUN BLD-MCNC: 14 MG/DL (ref 7–18)
BUN/CREAT SERPL: 11 (ref 10–20)
CALCIUM BLD-MCNC: 8.8 MG/DL (ref 8.5–10.1)
CHLORIDE SERPL-SCNC: 106 MMOL/L (ref 98–112)
CLARITY UR: CLEAR
CO2 SERPL-SCNC: 28 MMOL/L (ref 21–32)
COLOR UR: YELLOW
CREAT BLD-MCNC: 1.27 MG/DL
DEPRECATED RDW RBC AUTO: 38.5 FL (ref 35.1–46.3)
EOSINOPHIL # BLD AUTO: 0.09 X10(3) UL (ref 0–0.7)
EOSINOPHIL NFR BLD AUTO: 1.1 %
ERYTHROCYTE [DISTWIDTH] IN BLOOD BY AUTOMATED COUNT: 13.6 % (ref 11–15)
GLUCOSE BLD-MCNC: 96 MG/DL (ref 70–99)
GLUCOSE UR-MCNC: NEGATIVE MG/DL
HCT VFR BLD AUTO: 41.6 %
HGB BLD-MCNC: 15 G/DL
HGB UR QL STRIP.AUTO: NEGATIVE
IMM GRANULOCYTES # BLD AUTO: 0.02 X10(3) UL (ref 0–1)
IMM GRANULOCYTES NFR BLD: 0.3 %
KETONES UR-MCNC: NEGATIVE MG/DL
LEUKOCYTE ESTERASE UR QL STRIP.AUTO: NEGATIVE
LYMPHOCYTES # BLD AUTO: 2.31 X10(3) UL (ref 1–4)
LYMPHOCYTES NFR BLD AUTO: 29.4 %
MCH RBC QN AUTO: 28.5 PG (ref 26–34)
MCHC RBC AUTO-ENTMCNC: 36.1 G/DL (ref 31–37)
MCV RBC AUTO: 79.1 FL
MONOCYTES # BLD AUTO: 0.84 X10(3) UL (ref 0.1–1)
MONOCYTES NFR BLD AUTO: 10.7 %
NEUTROPHILS # BLD AUTO: 4.54 X10 (3) UL (ref 1.5–7.7)
NEUTROPHILS # BLD AUTO: 4.54 X10(3) UL (ref 1.5–7.7)
NEUTROPHILS NFR BLD AUTO: 57.9 %
NITRITE UR QL STRIP.AUTO: NEGATIVE
OSMOLALITY SERPL CALC.SUM OF ELEC: 284 MOSM/KG (ref 275–295)
PH UR: 6 [PH] (ref 5–8)
PLATELET # BLD AUTO: 219 10(3)UL (ref 150–450)
POTASSIUM SERPL-SCNC: 3.5 MMOL/L (ref 3.5–5.1)
PROT UR-MCNC: NEGATIVE MG/DL
RBC # BLD AUTO: 5.26 X10(6)UL
SODIUM SERPL-SCNC: 137 MMOL/L (ref 136–145)
SP GR UR STRIP: 1.02 (ref 1–1.03)
TROPONIN I HIGH SENSITIVITY: 15 NG/L
TROPONIN I HIGH SENSITIVITY: 17 NG/L
UROBILINOGEN UR STRIP-ACNC: <2
VIT C UR-MCNC: NEGATIVE MG/DL
WBC # BLD AUTO: 7.9 X10(3) UL (ref 4–11)

## 2022-04-24 PROCEDURE — 84484 ASSAY OF TROPONIN QUANT: CPT | Performed by: EMERGENCY MEDICINE

## 2022-04-24 PROCEDURE — 81003 URINALYSIS AUTO W/O SCOPE: CPT | Performed by: EMERGENCY MEDICINE

## 2022-04-24 PROCEDURE — 80048 BASIC METABOLIC PNL TOTAL CA: CPT

## 2022-04-24 PROCEDURE — 70551 MRI BRAIN STEM W/O DYE: CPT | Performed by: EMERGENCY MEDICINE

## 2022-04-24 PROCEDURE — 36415 COLL VENOUS BLD VENIPUNCTURE: CPT

## 2022-04-24 PROCEDURE — 85025 COMPLETE CBC W/AUTO DIFF WBC: CPT | Performed by: EMERGENCY MEDICINE

## 2022-04-24 PROCEDURE — 85025 COMPLETE CBC W/AUTO DIFF WBC: CPT

## 2022-04-24 PROCEDURE — 84484 ASSAY OF TROPONIN QUANT: CPT

## 2022-04-24 PROCEDURE — 80048 BASIC METABOLIC PNL TOTAL CA: CPT | Performed by: EMERGENCY MEDICINE

## 2022-04-24 PROCEDURE — 93005 ELECTROCARDIOGRAM TRACING: CPT

## 2022-04-24 PROCEDURE — 93010 ELECTROCARDIOGRAM REPORT: CPT | Performed by: EMERGENCY MEDICINE

## 2022-04-24 PROCEDURE — 99285 EMERGENCY DEPT VISIT HI MDM: CPT

## 2022-04-24 RX ORDER — MECLIZINE HYDROCHLORIDE 25 MG/1
25 TABLET ORAL 3 TIMES DAILY PRN
Qty: 21 TABLET | Refills: 0 | Status: SHIPPED | OUTPATIENT
Start: 2022-04-24

## 2022-04-24 NOTE — ED INITIAL ASSESSMENT (HPI)
Patient to Er from home with wife with c/o dizziness and \" not feeling right\". Patient is A&OX4 clear speech equal strength in all extremities. Patient denies history of high blood pressure. States was taking his blood pressure at home 180/101.

## 2023-03-13 ENCOUNTER — HOSPITAL ENCOUNTER (EMERGENCY)
Facility: HOSPITAL | Age: 58
Discharge: HOME | End: 2023-03-13
Payer: MEDICAID

## 2023-03-13 ENCOUNTER — APPOINTMENT (OUTPATIENT)
Dept: GENERAL RADIOLOGY | Facility: HOSPITAL | Age: 58
End: 2023-03-13
Attending: NURSE PRACTITIONER
Payer: MEDICAID

## 2023-03-13 VITALS
TEMPERATURE: 99 F | HEART RATE: 88 BPM | RESPIRATION RATE: 18 BRPM | WEIGHT: 240 LBS | DIASTOLIC BLOOD PRESSURE: 91 MMHG | SYSTOLIC BLOOD PRESSURE: 137 MMHG | BODY MASS INDEX: 33 KG/M2 | OXYGEN SATURATION: 100 %

## 2023-03-13 DIAGNOSIS — M54.50 ACUTE LEFT-SIDED LOW BACK PAIN WITHOUT SCIATICA: Primary | ICD-10-CM

## 2023-03-13 PROCEDURE — 99283 EMERGENCY DEPT VISIT LOW MDM: CPT

## 2023-03-13 PROCEDURE — 99284 EMERGENCY DEPT VISIT MOD MDM: CPT

## 2023-03-13 PROCEDURE — 72110 X-RAY EXAM L-2 SPINE 4/>VWS: CPT | Performed by: NURSE PRACTITIONER

## 2023-03-13 PROCEDURE — 96372 THER/PROPH/DIAG INJ SC/IM: CPT

## 2023-03-13 RX ORDER — CYCLOBENZAPRINE HCL 10 MG
10 TABLET ORAL EVERY 8 HOURS PRN
Qty: 12 TABLET | Refills: 0 | Status: SHIPPED | OUTPATIENT
Start: 2023-03-13 | End: 2023-03-17

## 2023-03-13 RX ORDER — ORPHENADRINE CITRATE 30 MG/ML
60 INJECTION INTRAMUSCULAR; INTRAVENOUS ONCE
Status: COMPLETED | OUTPATIENT
Start: 2023-03-13 | End: 2023-03-13

## 2023-03-13 RX ORDER — KETOROLAC TROMETHAMINE 30 MG/ML
30 INJECTION, SOLUTION INTRAMUSCULAR; INTRAVENOUS ONCE
Status: COMPLETED | OUTPATIENT
Start: 2023-03-13 | End: 2023-03-13

## 2023-03-13 NOTE — ED INITIAL ASSESSMENT (HPI)
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

## 2023-03-14 NOTE — DISCHARGE INSTRUCTIONS
1. Tylenol 650 mg every 6 hours or 1000 mg every 8 hours for pain. Motrin 600 every 6 hours. Do not take Motrin if you are already taking naproxen, Advil, Aleve, Voltaren, Ibuprofen or Toradol as these are all similar drugs. Do not take any of these drugs long-term. 2.  Ice every 6 hours for 20 minutes. 3.  Flexeril as prescribed. This is a muscle relaxer. Do not take if you will need to drink or drive as this can make you drowsy. 4.  Primary care follow-up in 2 to 3 days for re-evaluation. 5.  Return to the ER with new or worsening concerns. 6.  Read attached information.

## (undated) DEVICE — MEDI-VAC NON-CONDUCTIVE SUCTION TUBING 6MM X 1.8M (6FT.) L: Brand: CARDINAL HEALTH

## (undated) DEVICE — 9534HP TRANSPARENT DRSG W/FRAME: Brand: 3M™ TEGADERM™

## (undated) DEVICE — PROXIMATE SKIN STAPLERS (35 WIDE) CONTAINS 35 STAINLESS STEEL STAPLES (FIXED HEAD): Brand: PROXIMATE

## (undated) DEVICE — DRAPE SHEET LG

## (undated) DEVICE — GAUZE SPONGES,12 PLY: Brand: CURITY

## (undated) DEVICE — CAUTERY BLADE 2IN INS E1455

## (undated) DEVICE — DRAPE SRG 150X54IN LEICA

## (undated) DEVICE — LINE MNTR ADLT SET O2 INTMD

## (undated) DEVICE — DERMABOND LIQUID ADHESIVE

## (undated) DEVICE — CHLORAPREP 26ML APPLICATOR

## (undated) DEVICE — 3.0MM PRECISION NEURO (MATCH HEAD)

## (undated) DEVICE — DRAPE SLUSH/WARMER W/DISC

## (undated) DEVICE — 3M™ TEGADERM™ TRANSPARENT FILM DRESSING, 1626W, 4 IN X 4-3/4 IN (10 CM X 12 CM), 50 EACH/CARTON, 4 CARTON/CASE: Brand: 3M™ TEGADERM™

## (undated) DEVICE — SPK10186 WILSON TABLE KIT: Brand: SPK10186 WILSON TABLE KIT

## (undated) DEVICE — LAMINECTOMY: Brand: MEDLINE INDUSTRIES, INC.

## (undated) DEVICE — ADHESIVE MASTISOL 2/3CC VL

## (undated) DEVICE — X-RAY DETECTABLE SPONGES,16 PLY: Brand: VISTEC

## (undated) DEVICE — FORCEP RADIAL JAW 4

## (undated) DEVICE — PERIFIX® 18 GA. X 3-1/2 IN. (90 MM) TUOHY, 5 ML GLASS LUER LOCK LOR TRAY (KIT): Brand: PERIFIX®

## (undated) DEVICE — CODMAN® SURGICAL PATTIES 1/2" X 1/2" (1.27CM X 1.27CM): Brand: CODMAN®

## (undated) DEVICE — SOL  .9 1000ML BTL

## (undated) DEVICE — SURGICEL FIBULAR 2X4

## (undated) DEVICE — KIT ENDO ORCAPOD 160/180/190

## (undated) DEVICE — SPONGE NEURO 1/2X1-1/2

## (undated) DEVICE — DRESSING BIOPATCH 1X4 CNTR

## (undated) DEVICE — KIT CLEAN ENDOKIT 1.1OZ GOWNX2

## (undated) DEVICE — SUCTION CANISTER, 3000CC,SAFELINER: Brand: DEROYAL

## (undated) DEVICE — MINI-BLADE®: Brand: BEAVER®

## (undated) DEVICE — KIT DRN 1/8IN PVC 3 SPRG EVAC

## (undated) DEVICE — NEEDLE SPINAL 18X3-1/2 PINK.

## (undated) DEVICE — SUTURE VICRYL 0 CT-1

## (undated) DEVICE — SUTURE MONOCRYL 3-0 Y936H

## (undated) DEVICE — GAMMEX® PI HYBRID SIZE 7, STERILE POWDER-FREE SURGICAL GLOVE, POLYISOPRENE AND NEOPRENE BLEND: Brand: GAMMEX

## (undated) DEVICE — INSULATED BLADE ELECTRODE 6.5

## (undated) DEVICE — GAMMEX® PI HYBRID SIZE 8.5, STERILE POWDER-FREE SURGICAL GLOVE, POLYISOPRENE AND NEOPRENE BLEND: Brand: GAMMEX

## (undated) DEVICE — SUTURE VICRYL 2-0 CT-1

## (undated) DEVICE — OMNIPAQUE 240ML VIAL

## (undated) DEVICE — FLOSEAL HEMOSTATIC MATRIX, 5ML: Brand: FLOSEAL HEMOSTATIC MATRIX

## (undated) DEVICE — 6 ML SYRINGE LUER-LOCK TIP: Brand: MONOJECT

## (undated) DEVICE — 12 ML SYRINGE LUER-LOCK TIP: Brand: MONOJECT

## (undated) NOTE — LETTER
Date & Time: 3/13/2023, 9:22 PM  Patient: Lucy English  Encounter Provider(s):    LYNNE Diaz       To Whom It May Concern:    Marisela Treadwell was seen and treated in our department on 3/13/2023. He should not return to work until 03/16/2023.     If you have any questions or concerns, please do not hesitate to call.        _____________________________  Physician/APC Signature

## (undated) NOTE — LETTER
[facility]  Administration of Anesthesia  1. Sesar Charlton, or _________________________________ acting on his behalf, (Patient) (Dependent/Representative) request to receive anesthesia for my pending procedure/operation/treatment.   A ph 6. OBSTETRIC PATIENTS: Specific risks/consequences of spinal/epidural anesthesia may include itching, low blood pressure, difficulty urinating, slowing of the baby's heart rate and headache.  Rare risks include infections, high spinal block, spinal bleeding ___________________________________________________           _____________________________________________________  Date/Time                                                                                               Responsible person in case of minor

## (undated) NOTE — LETTER
[address] [place] [address]      Authorization for Surgical Operation and Procedure     Date:___________                                                                                                         Time:_______ 4.   Should the need arise during my operation or immediate post-operative period, I also consent to the administration of blood and/or blood products.   Further, I understand that despite careful testing and screening of blood or blood products by alessia 8.   I recognize that in the event my procedure results in extended X-Ray/fluoroscopy time, I may develop a skin reaction. 9.  If I have a Do Not Attempt Resuscitation (DNAR) order in place, that status will be suspended while in the operating room, proc STATEMENT OF PHYSICIAN My signature below affirms that prior to the time of the procedure; I have explained to the patient and/or his/her legal representative, the risks and benefits involved in the proposed treatment and any reasonable alternative to the

## (undated) NOTE — LETTER
[address] [place] [address]      Authorization for Surgical Operation and Procedure     Date:___________                                                                                                         Time:_______ 4.   Should the need arise during my operation or immediate post-operative period, I also consent to the administration of blood and/or blood products.   Further, I understand that despite careful testing and screening of blood or blood products by alessia 8.   I recognize that in the event my procedure results in extended X-Ray/fluoroscopy time, I may develop a skin reaction. 9.  If I have a Do Not Attempt Resuscitation (DNAR) order in place, that status will be suspended while in the operating room, proc STATEMENT OF PHYSICIAN My signature below affirms that prior to the time of the procedure; I have explained to the patient and/or his/her legal representative, the risks and benefits involved in the proposed treatment and any reasonable alternative to the

## (undated) NOTE — LETTER
[address], [place]  Authorization for Invasive Procedures  1.  I hereby authorize Toshia Duffy, my physician and whomever may be designated as the doctor's assistant, to perform the following operation and/or procedurembarLumber following are some, but not all, of the potential risks that can occur: fever and allergic reactions, hemolytic reactions, transmission of disease such as hepatitis, AIDS, cytomegalovirus (CMV), and flluid overload.  In the event that I wish to have autolog administration of sedation/analgesia as may be necessary or desirable in the judgment of my physician.      Signature of Patient:  ________________________________________________ Date: _________Time: _________    Responsible person in case of minor or unco

## (undated) NOTE — LETTER
[facility]  Administration of Anesthesia  1. Farideh Cowan, or _________________________________ acting on his behalf, (Patient) (Dependent/Representative) request to receive anesthesia for my pending procedure/operation/treatment.   A ph 6. OBSTETRIC PATIENTS: Specific risks/consequences of spinal/epidural anesthesia may include itching, low blood pressure, difficulty urinating, slowing of the baby's heart rate and headache.  Rare risks include infections, high spinal block, spinal bleeding ___________________________________________________           _____________________________________________________  Date/Time                                                                                               Responsible person in case of minor

## (undated) NOTE — LETTER
[address], [place]  Authorization for Invasive Procedures  1.  I hereby authorize Dr. Elizabeth Major  my physician and whomever may be designated as the doctor's assistant, to perform the following operation and/or procedure:Lumbar Punctu fever and allergic reactions, hemolytic reactions, transmission of disease such as hepatitis, AIDS, cytomegalovirus (CMV), and flluid overload.  In the event that I wish to have autologous transfusions of my own blood, or a directed donor transfusion, I marta Signature of Patient:  ________________________________________________ Date: _________Time: _________    Responsible person in case of minor or unconscious: _____________________________Relationship: ____________     Witness Signature: ___________________

## (undated) NOTE — LETTER
[facility]erry Liu [address], [place], [place]  [address]  INFORMED CONSENT FOR TRANSFUSION OF BLOOD OR BLOOD PRODUCTS  My physician has informed me of the nature, purpose, benefits and risks of transfusion for blood and blood components that __________________________________________          ______________________________________________  (Signature of Patient)                                                            (Responsible party in case of Minor,

## (undated) NOTE — ED AVS SNAPSHOT
Song Meyers   MRN: [id number]    Department:  [facility] Emergency Department   Date of Visit:  8/3/2019           Disclosure     Insurance plans vary and the physician(s) referred by the ER may not be covered by your plan.  Please contact your CARE PHYSICIAN AT ONCE OR RETURN IMMEDIATELY TO THE EMERGENCY DEPARTMENT. If you have been prescribed any medication(s), please fill your prescription right away and begin taking the medication(s) as directed.   If you believe that any of the medications